# Patient Record
Sex: MALE | Race: WHITE | Employment: FULL TIME | URBAN - METROPOLITAN AREA
[De-identification: names, ages, dates, MRNs, and addresses within clinical notes are randomized per-mention and may not be internally consistent; named-entity substitution may affect disease eponyms.]

---

## 2020-01-22 ENCOUNTER — HOSPITAL ENCOUNTER (OUTPATIENT)
Dept: LAB | Age: 70
Discharge: HOME | End: 2020-01-22

## 2020-01-22 PROCEDURE — 88305 TISSUE EXAM BY PATHOLOGIST: CPT

## 2022-11-29 ENCOUNTER — OFFICE VISIT (OUTPATIENT)
Dept: ORTHOPEDIC SURGERY | Age: 72
End: 2022-11-29

## 2022-11-29 VITALS — WEIGHT: 190 LBS | BODY MASS INDEX: 29.82 KG/M2 | HEIGHT: 67 IN

## 2022-11-29 DIAGNOSIS — M25.562 LEFT KNEE PAIN, UNSPECIFIED CHRONICITY: Primary | ICD-10-CM

## 2022-11-29 DIAGNOSIS — M48.00 SPINAL STENOSIS, UNSPECIFIED SPINAL REGION: ICD-10-CM

## 2022-11-29 DIAGNOSIS — M17.12 PRIMARY OSTEOARTHRITIS OF LEFT KNEE: ICD-10-CM

## 2022-11-29 DIAGNOSIS — M79.605 LOW BACK PAIN RADIATING TO BOTH LEGS: ICD-10-CM

## 2022-11-29 DIAGNOSIS — M79.604 LOW BACK PAIN RADIATING TO BOTH LEGS: ICD-10-CM

## 2022-11-29 DIAGNOSIS — M54.50 LOW BACK PAIN RADIATING TO BOTH LEGS: ICD-10-CM

## 2022-11-29 NOTE — PROGRESS NOTES
Name: Tyrell Wolfe  YOB: 1950  Gender: male  MRN: [id number]    CC:   Chief Complaint   Patient presents with    Knee Pain     Left Knee pain          HPI: Tyrell Wolfe is a 67 y.o. male with a PMHx of low arthroscopy of both knees with Dr.Baumgarten. They present here for evaluation of left knee pain. This is been gradually coming on for some time. He has a history of former smoker and gout. His pain is most prominent when he is playing golf. Occasionally has it with walking and at night. Does not have any groin pain or instability of his knee. He does have a history of back pain. He does have some burning pain to both of his thighs with prolonged standing or walking. He has tried Aleve. He is previously undergone viscosupplementation a year ago which she says was not very helpful. No Known Allergies      Review of Systems:  As per HPI. Pertinent positives and negatives are addressed with the patient, particularly those related to musculoskeletal concerns. Non-orthopaedic concerns were referred back to the primary care physician. PHYSICAL EXAMINATION:   The patient appears their stated age and they are in no distress. Ht 5' 7\" (1.702 m)   Wt 190 lb (86.2 kg)   BMI 29.76 kg/m²       The lower extremities are as described below. No significant distal edema, venous stasis changes  There is no lymph adenopathy in the popliteal or malleolar region. Sensation is intact to light touch bilaterally. The gait is noted to be grossly normal  Knee Range of motion is 0 to 125 degrees bilaterally  AP varus and valgus stressing of the bilateral knees reveal global stability  ROM of hips is painless  Limb lengths are equal.  Hip flexion is excellent. Their judgment and insight are normal.  They are oriented to situation. mood and affect appropriate.       X-RAY: AP lateral skier and sunrise views of the left knee reveal severe narrowing of the medial compartment best appreciated on the skiers view. There is subchondral sclerosis. AP lateral and spot views of the lumbar spine reveal multilevel spondylosis and degenerative disc disease. Mild scoliotic changes. Grade 1 spondylolisthesis at L4-5. IMPRESSION: Osteoarthropathy of the left knee, lumbar spondylosis degenerative disc disease and spondylolisthesis    RECOMMENDATIONS:    Reviewed x-ray findings with the patient. The patient has moderate severe osteoarthritis of the left knee. I have discussed treatment options of continued nonoperative treatment versus left total knee arthroplasty. I have discussed the surgical procedure, surgical risk, and rehab time. He understands this and would like to proceed with surgery this next spring. He was provided with total knee literature, given a form for handicap parking sticker, referred to physical therapy for up rehab program.  The patient also is having burning and discomfort in the upper legs associated with standing walking. He has severe degenerative disc disease of the lumbar spine and I believe his symptoms are secondary to spinal stenosis.   I have recommended an MRI scan of the lumbar spine and referral to our spine team.      4--this is a chronic illness/condition with exacerbation

## 2022-11-29 NOTE — LETTER
[address] [place] [address] [address] [place] [address]        I recommended a handicap parking placard for the reason, walking 100 feet nonstop will aggravate their existing orthopedic condition, also the orthopedic condition creates a substantial limitation in routine walking. This permit is of medical necessity secondary to an impairment of mobility. Permanent tag.      MD [place] LIC # [id number]  Dr Jacque Galvin

## 2022-12-12 ENCOUNTER — OFFICE VISIT (OUTPATIENT)
Dept: ORTHOPEDIC SURGERY | Age: 72
End: 2022-12-12
Payer: MEDICARE

## 2022-12-12 DIAGNOSIS — M43.16 SPONDYLOLISTHESIS OF LUMBAR REGION: Primary | ICD-10-CM

## 2022-12-12 DIAGNOSIS — M47.816 FACET ARTHROPATHY, LUMBAR: ICD-10-CM

## 2022-12-12 DIAGNOSIS — M48.062 LUMBAR STENOSIS WITH NEUROGENIC CLAUDICATION: ICD-10-CM

## 2022-12-12 DIAGNOSIS — M51.36 DDD (DEGENERATIVE DISC DISEASE), LUMBAR: ICD-10-CM

## 2022-12-12 PROCEDURE — 99203 OFFICE O/P NEW LOW 30 MIN: CPT | Performed by: PHYSICIAN ASSISTANT

## 2022-12-12 PROCEDURE — 1123F ACP DISCUSS/DSCN MKR DOCD: CPT | Performed by: PHYSICIAN ASSISTANT

## 2022-12-12 RX ORDER — COLCHICINE 0.6 MG/1
0.6 TABLET ORAL DAILY
COMMUNITY
Start: 2021-06-23

## 2022-12-12 RX ORDER — ASPIRIN 81 MG/1
81 TABLET ORAL DAILY
COMMUNITY

## 2022-12-12 RX ORDER — CARVEDILOL 6.25 MG/1
TABLET ORAL
COMMUNITY
Start: 2022-10-17

## 2022-12-12 NOTE — LETTER
[facility]                                                     ANGELINA PAREDES  1950  MRN [id number]                                              ROOM NUMBER______      Radiographic Studies:    Cervical MRI      Thoracic MRI         Lumbar MRI          Pelvis MRI        CONTRAST    CT Myelogram: _______________   NCS/EMG ________________ ( UE  /  LE )     MRI of ___________________          Other: ____________________      Injections:    KNEE    HIP  Depomedrol _____ mg Euflexxa _____    _______________ TFESI/SNRB  _______________ SI Joint  _______________ MARICEL    _______________ Facet  _______________Piriformis/ Sciatica      Medications:    Oral Steroids _______________  NSAIDS _______________    Muscle Relaxers _______________  Neurontin/Lyrica _______________    Pain Medicine _______________  Other _______________                       Physical Therapy:    Lumbar     Thoracic      Cervical     Hip       Knee       Shoulder               Traction          Ultrasound          Dry Needling      Referral:    Pain referral:  [facility]   [facility]   Other: ______________________________    Follow-up/ Refer__________________________________________________    Authorization to hold blood thinners:___________________________________

## 2022-12-12 NOTE — PATIENT INSTRUCTIONS
Adult Spondylolisthesis in the Low Back    In spondylolisthesis, one of the bones in your spine -- called a vertebra -- slips forward and out of place. This may occur anywhere along the spine, but is most common in the lower back (lumbar spine). In some people, this causes no symptoms at all. Others may have back and leg pain that ranges from mild to severe. Understanding how your spine works can help you better understand spondylolisthesis. Types of Spondylolisthesis  Many types of spondylolisthesis can affect adults. The two most common types are degenerative and spondylolytic. There are other less common types of spondylolisthesis, such as slippage caused by a recent, severe fracture or a tumor. Degenerative Spondylolisthesis  As we age, general wear and tear causes changes in the spine. Intervertebral disks begin to dry out and weaken. They lose height, become stiff, and begin to bulge. This disk degeneration is the start to both arthritis and degenerative spondylolisthesis (DS). Degenerative spondylolisthesis  As arthritis develops, it weakens the joints and ligaments that hold your vertebrae in the proper position. The ligament along the back of your spine (ligamentum flavum) may begin to buckle. One of the vertebrae on either side of a worn, flattened disk can loosen and move forward over the vertebra below it. This slippage can narrow the spinal canal and put pressure on the spinal cord. This narrowing of the spinal canal is called spinal stenosis and is a common problem in patients with DS. Women are more likely than men to have DS, and it is more common in patients who are older than 50. A higher incidence has been noted in the -American population. In this x-ray taken from the side, vertebrae in the low back have slipped out of place due to degenerative spondylolisthesis.       Spondylolytic Spondylolisthesis  One of the bones in your lower back can break and this can cause a vertebra to slip forward. The break most often occurs in the area of your lumbar spine called the pars interarticularis. In most cases of spondylolytic spondylolisthesis, the pars fracture occurs during adolescence and goes unnoticed until adulthood. The normal disk degeneration that occurs in adulthood can then stress the pars fracture and cause the vertebra to slip forward. This type of spondylolisthesis is most often seen in middle-aged men. (Left) In spondylolysis, a fracture often occurs at the pars interarticularis. (Right) Because of the pars fracture, only the front part of the bone slips forward. Because a pars fracture causes the front (vertebra) and back (lamina) parts of the spinal bone to disconnect, only the front part slips forward. This means that narrowing of the spinal canal is less likely than in other kinds of spondylolisthesis, such as DS in which the entire spinal bone slips forward. This x-ray taken from the side shows a pars fracture (arrow) and the resulting spondylolisthesis. Symptoms  About 4% to 6% of the [place] population has spondylolysis and spondylolisthesis. Most of these people live with the condition for many years without any pain or other symptoms. Symptoms of Degenerative Spondylolisthesis  Patients with DS often visit the doctor's office once the slippage has begun to put pressure on the spinal nerves. Although the doctor may find arthritis in the spine, the symptoms of DS are typically the same as symptoms of spinal stenosis. For example, DS patients often develop leg and/or lower back pain. The most common symptoms in the legs include a feeling of vague weakness associated with prolonged standing or walking. Leg symptoms may be accompanied by numbness, tingling, and/or pain that is often affected by posture. Forward bending or sitting often relieves the symptoms because it opens up space in the spinal canal. Standing or walking often increases symptoms.     Symptoms of Spondylolytic Spondylolisthesis  Most patients with spondylolytic spondylolisthesis do not have pain and are often surprised to find they have the slippage when they see it in x-rays. They typically visit a doctor with low back pain related to activities. The back pain is sometimes accompanied by leg pain. To Top     Treatment    Nonsurgical Treatment  Although nonsurgical treatments will not repair the slippage, many patients report that these methods do help relieve symptoms. Physical therapy and exercise. Specific exercises can strengthen and stretch your lower back and abdominal muscles. Medication. Analgesics and non-steroidal anti-inflammatory medicines may relieve pain. Steroid injections. Cortisone is a powerful anti-inflammatory. Cortisone injections around the nerves or in the \"epidural space\" can decrease swelling, as well as pain. It is not recommended to receive these, however, more than three times per year. These injections are more likely to decrease pain and numbness, but not weakness of the legs. Surgical Treatment  Surgical candidates with DS. Surgery for degenerative spondylolisthesis is generally reserved for the patient who does not improve after a trial of nonsurgical treatment for at least 3 to 6 months. In making a decision about surgery, your doctor will also take into account the extent of arthritis in your spine, as well as whether your spine has excessive movement. DS patients who are candidates for surgery often are unable to walk or stand, and have a poor quality of life due to the pain and weakness. Surgical candidates with spondylolytic spondylolisthesis. Patients with symptoms that have not responded to nonsurgical treatment for at least 6 to12 months may be candidates for surgery.   If the slippage is getting worse or the patient has progressive neurologic symptoms, such as weakness, numbness, or falling, and/or symptoms of cauda equina syndrome, surgery may help.  Surgical procedures. Surgery for both DS and spondylolytic spondylolisthesis includes removing the pressure from the nerves and spinal fusion. Removing the pressure involves opening up the spinal canal. This procedure is called a laminectomy. Spinal fusion is essentially a \"welding\" process. The basic idea is to fuse together the painful vertebrae so that they heal into a single, solid bone. In spinal fusion, screws are often used to help stabilize the spine. Surgical recovery. The fusion process takes time. It may be several months before the bone is solid, although your comfort level will often improve much faster. Spondylitis (Spinal Arthritis) and Facet Joint Syndrome  At each level in our spine, there is a single disc  the bones (vertebrae) in front of the spinal canal, and a pair of joints called facet joints joining the bones together behind the spinal canal. As we age, the spinal discs and facet joints can wear out and degenerate. Disc degeneration is the term used to describe the wearing out of the discs. Spondylosis is the term used to describe degeneration and arthritis of the facet joints. Degeneration of the spine is a normal aging process, and in most cases spinal arthritis does not cause significant symptoms. However, for some people, arthritic facet joints can cause significant pain. Back or neck pain resulting from arthritic or inflamed facet joints is a condition called facet joint syndrome.         Symptoms  Back pain with radiation into hips and buttocks or neck pain with radiation into the shoulders  Natural History (Doing Nothing)  Not all arthritic facet joints cause symptoms   Back or neck pain may not be coming from the facet joints even if they are arthritic   Symptoms may resolve without treatment   Symptoms may be short-lived and infrequent   Rarely, patients develop more persistent and debilitating pain   Facet joints have very little ability to repair themselves or regenerate  Three Phases of Treatment:   Phase I - Non-Invasive Treatments   Phase II - Spinal Injections   Phase III - Surgery (rare for this condition unless radiculopathy is present)   Goals of Each Phase:   Relieve Pain   Improve Function  Treatment Options: Phase I - Non-Invasive Treatments  Physical Therapy and Regular Home Exercise   Neck or Back Strengthening   Flexibility and Stretching  Oral Medications   Steroids   Non-Steroid Anti-Inflammatories (NSAIDs)   Pain relievers   Muscle Relaxants  Ice and Heat   4-6 weeks of Phase I treatment before MRI and going to Phase II  Treatment Options: Phase II - Facet Joint Injections and Facet Joint Nerve Ablation (RFNA)  Facet Joint Injections   Outpatient procedure   Done with x-ray guidance   Steroid is injected into the inflamed joint to reduce pain   May relieve symptoms, but will not reverse the joint arthritis   Successful injection may help confirm that pain is coming from the facet joints   Facet Joint Rhizotomy (Nerve Ablation) (Radiofrequency Nerve Ablation - RFNA)  The word rhizotomy means nerve destruction or nerve ablation. In facet rhizotomy, the tiny nerve fibers that carry pain signals from the facet joints to the brain are selectively destroyed using some form of energy. For patients who have had successful, but temporary relief of their back or neck pain from the facet injections, facet rhizotomy may provide more long-term relief. Facet rhizotomy is most commonly performed using a form of energy called radiofrequency (RF) energy. When done with RF, this technique is often called radiofrequency nerve ablation (RFNA).    Treatment Options: Phase III - Surgery  Rarely needed for Spondylosis or Facet Joint Syndrome unless radiculopathy or stenosis is present   Back and neck pain from Spondylosis can be treated non-surgically in most cases    [facility]    MD Miguel A Godfrey, CORINA Loera NP    Main Office  [address] [facility],[address], [address] [facility], [facility] [address]        For Appointments at either location, please call [phone]      Lumbar Stenosis    What is lumbar stenosis? Stenosis is defined as the narrowing of the spinal canal. The term lumbar simply refers to the lowest 5 vertebrae in the spine. Externally this corresponds to the small of the back just above your buttocks. Each lumbar vertebra has 3 tunnels which can be affected by stenosis. The large tunnel in the middle of the vertebra is where the spinal cord and all of the spinal nerves are contained. Also, a much smaller tunnel is on each side of the vertebra. This is where the individual nerves exit the spine. Narrowing of any of these tunnels can result in pressure on the spinal cord or spinal nerves. Spinal stenosis may involve multiple levels of the spine or may be localized to a single level. What causes spinal stenosis? Typically the tunnels in vertebrae are quite spacious and much larger than the spinal cord and nerves that pass through them. However, some patients are genetically programmed to have smaller size tunnels in the spine, predisposing them to develop symptoms from spinal stenosis. There are several other potential causes of spinal stenosis. A single event, such as a disc herniation or a fracture can cause symptoms of stenosis. But more often, it is caused by arthritic changes, such as bone spurs, thickened ligaments, joint laxity, and disc bulges. This results in pinched spinal nerves which gives symptoms of buttock and leg pain and weakness. What are the symptoms of spinal stenosis? Patients will typically have low back pain along with pain in the buttocks and legs.   This usually affects patients more when they are standing or walking, and their pain is often relieved with sitting or lying. Many patients report that the decrease in their ability to walk is the most bothersome part of the condition. And, some have found that leaning forward (such as using a cart while shopping) has enabled them to go further with less pain. Are there other conditions that can cause similar symptoms? The condition which most closely mimics spinal stenosis is poor blood circulation to the legs (vascular claudication). Other conditions such as diabetic neuropathy and hip or knee arthritis also have very similar symptoms to spinal stenosis. What is the prognosis? The natural history of degenerative spinal stenosis is usually a slow progression, gradually reducing the ability to stand or walk for extended periods. What treatments are available? The use of anti-inflammatory medications may give partial relief of symptoms. Physical therapy is often prescribed initially, which may improve lumbar range of motion and strength. Chiropractic care may help ease early symptoms in some patients. A series of steroid injections into the spine may calm the inflammation of the nerves and give temporary relief of the buttock and leg symptoms. Though, these treatments may help the patient cope with the symptoms for several years, they have little effect on the natural history of the disease which is slow progression. When should I consider consulting a spine surgeon? When you are no longer able to tolerate the symptoms or it is interfering with your everyday activities despite the use of conservative treatments, you may be a good candidate for a decompression type of spine surgery. The procedure typically performed is called a laminectomy. Some patients may require a fusion in addition to the laminectomy if there is too much joint laxity from the arthritic changes in the spine.    A decompression operation for spinal stenosis is about 80% effective in reducing your buttock and leg symptoms, including your ability to stand and walk. Though there may be some reduction in low back pain, a laminectomy is much less predictable for the treatment of isolated back pain without symptoms in the buttocks or legs.       [facility]    MD Deon Herrera MD Amy Nolia Comer, CORINA Cage NP    Main Office  [address] [facility],[address], [facility] [place] [address] [address]        For Appointments at either location, please call [phone]

## 2022-12-12 NOTE — PROGRESS NOTES
Name: Guerita Whyte  YOB: 1950  Gender: male  MRN: [id number]    CC: Back Pain (Low back pain with front thigh pain)       HPI: This is a 67y.o. year old male who  has a past medical history of Arthritis, Cancer ([address] GERD (gastroesophageal reflux disease), Gout, and Hypertension. .  Is referred to me by Dr. Wendy Gorman. He is scheduled for left total knee arthroplasty May 2023. Further evaluation he did discuss lower back pain. Patient has reported some chronic lower back pain can somewhat radiate into the buttock and hips but especially in the lower back. Occasionally at night and when he wakes up it would keep him from going back to sleep with achiness in the back. He also describes some burning in his thighs after his been standing or walking for period time but he is usually able to play golf okay its more standing going to a museum where he has a burning in the thighs. There was concern he may have some referred pain from his back or stenosis. MRI scan and x-rays were done. He is here today for further follow-up. History was obtained by patient    This patient  has not had lumbar surgery in the past.      AMB PAIN ASSESSMENT 12/12/2022   Location of Pain Back   Location Modifiers Left;Right   Severity of Pain 5   Quality of Pain Aching; Other (Comment)   Duration of Pain Persistent   Frequency of Pain Constant   Date Pain First Started 1/9/2019   Aggravating Factors Other (Comment); Standing   Limiting Behavior No   Relieving Factors Other (Comment); Rest   Result of Injury No   Work-Related Injury No   Are there other pain locations you wish to document? No            ROS/Meds/PSH/PMH/FH/SH: I personally reviewed the patient's collected intake data.   Below are the pertinents:    No Known Allergies      Current Outpatient Medications:     aspirin 81 MG EC tablet, Take 81 mg by mouth daily, Disp: , Rfl:     carvedilol (COREG) 6.25 MG tablet, , Disp: , Rfl:     colchicine (COLCRYS) 0.6 MG tablet, Take 0.6 mg by mouth daily, Disp: , Rfl:     esomeprazole (NEXIUM) 20 MG delayed release capsule, Take 20 mg by mouth daily, Disp: , Rfl:     allopurinol (ZYLOPRIM) 100 MG tablet, Take 100 mg by mouth as needed, Disp: , Rfl:     amLODIPine (NORVASC) 10 MG tablet, Take 10 mg by mouth, Disp: , Rfl:     benazepril (LOTENSIN) 40 MG tablet, Take 40 mg by mouth daily, Disp: , Rfl:     Naproxen Sodium 220 MG CAPS, Take 1 tablet by mouth as needed, Disp: , Rfl:     omeprazole (PRILOSEC) 20 MG delayed release capsule, Take 20 mg by mouth (Patient not taking: Reported on 12/12/2022), Disp: , Rfl:     rosuvastatin (CRESTOR) 5 MG tablet, Take 5 mg by mouth, Disp: , Rfl:     Past Surgical History:   Procedure Laterality Date    KNEE ARTHROSCOPY Bilateral     TONSILLECTOMY  age 4       There is no problem list on file for this patient. Tobacco:  reports that he quit smoking about 14 years ago. His smoking use included cigarettes. He smoked an average of 2 packs per day. He has never used smokeless tobacco.  Alcohol:   Social History     Substance and Sexual Activity   Alcohol Use Yes    Alcohol/week: 10.0 standard drinks        Physical Exam:   @VS1@   GENERAL:  Adult in no acute distress, well developed, well nourished Patient is appropriately conversant  The patient ambulates with a normal gait. ROM of bilateral hip(s) reveals no irritability. NEURO:  Cranial nerves grossly intact. No motor deficits. Sensory testing reveals intact sensation to light touch and in the distribution of the L3-S1 dermatomes bilaterally      Strength testing in the lower extremity reveals the following based on the 5 point grading scale:     HF (L2) H Ab (L5) KE (L3/4) ADF (L4) EHL (L5) A Ev (S1) APF (S1)   Right 5 5 5 5 5 5 5   Left 5 5 5 5 5 5 5     PSYCH:  Alert and oriented X 3. Appropriate affect. Intact judgment and insight.          Radiographic Studies:     AP, lateral and spot views of the lumbar spine: 12/12/22      I reviewed previous x-rays of the lumbar spine from November 29, 2022. There isSlight levoscoliosis of the lumbar spine. Hips show no significant DJD. Sagittal view of the lumbar spine reveals spondylolisthesis L4-5 and bulky facet arthropathy degenerative disc changes multiple levels. MRI Result (most recent): MRI LUMBAR SPINE WO CONTRAST 12/07/2022    Narrative  MR OF THE LUMBAR SPINE WITHOUT CONTRAST. Clinical Indication: Lower back pain with left leg pain for years    Procedure: Multiplanar and multisequence MR images are performed of the lumbar  spine without gadolinium contrast.    Comparison: No prior    Findings: The lumbar vertebral bodies are normal in height and signal.  Multilevel degenerative disc signal is present. No aggressive bone lesion noted. There is no fracture. The marrow signal in the imaged sacrum is normal. The  conus medullaris is normal in morphology and signal terminating in expected  position at T12-L1    Axial images:    L1-L2: No central stenosis or foramina narrowing. L2-L3: No central stenosis or foramina narrowing. L3-L4: A circumferential disc bulge with mild degenerative facet arthropathy. There is no central stenosis. Moderate bilateral foramina narrowing is present. L4-L5: A circumferential disc bulge with left greater than right degenerative  facet arthropathy. There is no central stenosis. Moderate bilateral foramina  narrowing is present. L5-S1: A slight circumferential disc bulge with mild degenerative facet  arthropathy. Moderate to severe right and moderate left foramina narrowing are  present. Limited evaluation of the paraspinal soft tissues shows incomplete evaluation of  renal cysts, larger on the left. The hip joints are normal.    Impression  1. Multilevel degenerative disc and facet disease throughout the lumbar spine  without acute musculoskeletal abnormality or central spinal stenosis.   2. Bilateral foramina narrowing of varying severity at multiple levels as noted  in detail above. 3. Bilateral renal cysts, incompletely evaluated. I also independently reviewed the MRI of the lumbar spine. He has multilevel facet arthropathy. There is an anterior listhesis L4-5. L3-4 there is moderate bilateral foraminal narrowing today some lateral recess stenosis. L4-5 also some lateral recess stenosis and moderate bilateral foraminal narrowing. L5-S1 no central stenosis but there is some foraminal narrowing as well. Assessment/Plan:         Diagnosis Orders   1. Spondylolisthesis of lumbar region  Ambulatory referral to Physical Therapy      2. DDD (degenerative disc disease), lumbar  Ambulatory referral to Physical Therapy      3. Facet arthropathy, lumbar  Ambulatory referral to Physical Therapy      4. Lumbar stenosis with neurogenic claudication  Ambulatory referral to Physical Therapy            This patient's clinical history and physical exam is consistent with neurogenic claudication which is likely due to lumbar stenosis. Stenosis is mostly foraminal and lateral recess he does have spondylolisthesis at L4-5. I discussed the natural history of lumbar stenosis in that it is a degenerative condition that is usually slowly progressive resulting in gradual loss of mobility. I reassured the patient that this is not a condition that typically predisposes him   to an acute paraplegia; however, the more neurologic deficits acquired and the longer they go untreated, the less likely he is to have neurological improvements after an operation. He understands that conservative treatments typically include physical therapy, oral and/or epidural steroids, pain management, and simple observation. And, that these treatments do not address the anatomical pinching of the spinal nerves, but rather help patients cope with the resulting symptoms.   I also discussed potential surgical intervention if the symptoms fail to improve or there is a progressive neurologic deficit or conservative management has been exhausted. - A home exercise program was prescribed for stretching and strengthening. A list of exercises was provided. - Physical Therapy was prescribed and will include stretching, strengthening and modalities to promote blood flow, flexibility and core strengthening. I will see him back after his physical therapy this will probably be in March. If we need to do lumbar injections at that time we can discuss the possibility of lumbar facet but also lumbar L4-5 epidural.      No orders of the defined types were placed in this encounter. Orders Placed This Encounter   Procedures    Ambulatory referral to Physical Therapy        4 This is a chronic illness/condition with exacerbation and progression      Return in about 3 months (around 3/12/2023). Krissy Langley PA-C  12/12/22      Elements of this note were created using speech recognition software. As such, errors of speech recognition may be present.

## 2023-02-03 NOTE — PROGRESS NOTES
[facility]  [address]  [address] [address]  Dept: [phone]      Physical Therapy Initial Assessment     Insurance: No authorization required [facility])    Total Timed Procedure Codes: 30 min, Total Time: 55 min      Referring MD: Christopher Keys PA-C  Referral for: Chronic low back pain  Onset date: 2/6/2020      Diagnosis:     ICD-10-CM    1. Chronic left-sided low back pain with left-sided sciatica  M54.42     G89.29       2. Joint stiffness of spine  M25.60       3. Impaired mobility and ADLs  Z74.09     Z78.9       4. Difficulty walking  R26.2       5. Muscle weakness  M62.81       6. Chronic pain of left knee  M25.562     G89.29            Therapy precautions:None  Co-morbidities affecting plan of care: HTN, Prostate CA(2003), Left knee OA with TKA scheduled in May. PERTINENT MEDICAL HISTORY     Past medical and surgical history:   Past Medical History:   Diagnosis Date    Arthritis     OA    Cancer ([facility] [address] 2003    prostate - seed implants    GERD (gastroesophageal reflux disease)     controlled with meds    Gout     Hypertension     controlled with meds      Past Surgical History:   Procedure Laterality Date    KNEE ARTHROSCOPY Bilateral     TONSILLECTOMY  age 10     Medications: reviewed in chart   Allergies: No Known Allergies     Diagnostic exams (per chart review): MRI LUMBAR SPINE WO CONTRAST 12/07/2022  Impression  1. Multilevel degenerative disc and facet disease throughout the lumbar spine  without acute musculoskeletal abnormality or central spinal stenosis. 2. Bilateral foramina narrowing of varying severity at multiple levels as noted  in detail above. 3. Bilateral renal cysts, incompletely evaluated. SUBJECTIVE     Chief complaints/history of injury: Patient presents to therapy with chronic low back pain. He states he has left TKA scheduled in May. He c/o chronic low back pain that has progressively worsened over the last 2 years. He c/o left knee pain.  He c/o limited motion low back. He does not recall any injury to left knee or low back. He has increased pain low back and anterior thighs with prolonged standing. He c/o left side low back pain into left buttock. Received previous outpatient therapy? No    Pain Assessment:  Pain location: left low back and into left buttock/left knee    Average Pain/symptom intensity (0-10 scale)  Last 24 hours: 3/10 (low back)  Last week (1-7 days): 4/10 (at the worst)  How often do you feel symptoms? Frequently (51-75%)  Description: aching, burning, and dull  Aggravating factors: prolonged standing and walking  Alleviating factors: sitting, lying down, medication: Aleve, and rest    Neuro screen: denies numbness, tingling, and radiating pain He c/o burning pain in anterior thighs with prolonged standing    Social/Functional Hx: How would you rate your overall health? very good  Pt lives with independent spouse in a(n) condo with entry steps. Current DME: none  Work Status: Retired   Sleep: moderately disturbed  PLOF & Social Hx/Interests: Independent and active without physical limitations and participated in travel and golfing  How much have your symptoms interfered with daily activities? Moderately  Current level of function:  able to do ADLs with pain and modification. He is able to golf with minimal difficulty.  He has difficulty with prolonged standing and walking due to papin    Patient Stated Goals: reduce painful symptoms    OBJECTIVE EXAMINATION     Functional Outcome Questionnaire: Oswestry Low Back Pain Disability Questionnaire: 7/50= 14% functional deficit   Observation:   Posture:  no gross deviations                 Palpation: left piriformis     AROM Lumbar Spine: normal is 4/4    Lumbar Spine Right Left Comments   Trunk Flexioin 3     Trunk Extension 3     HS Flexibility (-44) (-38)       A/PROM Measures:      Right Left Comment   Knee Flexion 140A 132A    Knee Extension 0 4 L lacks full extension Strength/MMT (0-5 Scale):     Right Left Comment   Knee Flexion 5 5    Knee Extension 5 5          Hip Flexion 5 5    Hip Extension 5 5    Hip Abduction 5 5    Hip ER      Hip IR      Ankle DF 5 5    Ankle PF                  Special Tests/Function:   Gait: no gross deviations  Stair management:reciprocal ascending/descending    Special tests:   Standing forward bend: negative  Slump: negative  SLR: negative    Treatment provided today consisted of initial evaluation followed by: Therapeutic exercise (89000) x 30 min to address ROM/strength deficits and to develop an initial HEP as noted below. Included:   SAQ 20x  LAQ 20x  Quad sets 20x  SLR 10x2  SKTC 2x  LTR 2x  Piriformis stretch 2x  Seated HS stretch in chair 1 minute      Patient Education on the condition/pathology, involved anatomy, and exercise rationale. CLINICAL DECISION MAKING/ASSESSMENT     Personal Factors/co-morbidities affecting POC (1-2 Medium/3+High): coping styles/strategies  habits/routines  past/current experiences   Problem List: (1-2 Low/ 3 Medium/ 4+ High) Pain  ROM limitations  Strength deficits  Decreased endurance/activity Tolerance  ADL/functional limitations/modifications  Restricted social activity  Restricted recreational participation  Decreased Posey with Home Exercise Program  Difficulty sleeping    Clinical decision making: low complexity with therapist able to easily and confidently determine and predict expectations and future outcomes for the POC. Prognosis: good   Benefits and precautions of treatment explained to patient. Seng Murguia is a 67 y.o. male who presents to therapy today with evolving/changing clinical presentation (moderate complexity)  related to low back pain and left knee pain. Pt would benefit from skilled physical therapy services to address the deficits noted above for return to prior level of function. PLAN OF CARE     Effective Dates: 2/6/2023 TO 3/20/2023  (42 days). Frequency/Duration: 1x/week for 42 Day(s)  Interventions may include but are not limited to: (21670) Therapeutic exercise to develop ROM, strength, endurance and flexibility  (51442) Therapeutic activities using dynamic activities to improve function  (24158) Manual therapy techniques to improve joint and/or soft tissue mobility, ROM, and function as well as helping to decrease pain/spasms and swelling  Home exercise program (HEP) development  Modalities prn to address pain, spasms, and swelling: (42725/) Electrical stimulation- unattended  (77706) Ultrasound/phonophoresis  (20209) Vasopneumatic compression  (19187) Hot/cold pack    The referring physician has reviewed and approved this evaluation and plan of care as noted by the electronic signature attached to note.     GOALS     Short term goals to be met by 3/6/2023  (4 weeks):  Patient will be independent with HEP  Patient will increase left knee extension to 0 (full extension)  Patient will be able to stand for 30 minutes without pain and having to sit   Patient will be able to resume sleeping after getting out of bed to go to bathroom  Patient will have no pain/difficulty with bed mobility  Patient will be able to move sit to stand from 20\" seat without difficulty  Patient will report 2/10 pain low back with at worst      Long term goals to be met by 3/20/2023  (6 weeks):  Patient will demonstrate full pain-free ROM back/trunk   Patient will have full left knee extension without pain  Bbilateral hamstrings flexibility will increase by  10 degrees each LE  Patient will be able to stand for 60 minutes without back pain  Patient will be able to walk for 60 minutes on all surfaces including uneven surface without back pain   Patient will be able to ambulate normally for community distances without assistive device  Patient will be able to sleep all night without waking due to back pain  Patient will return to previous level of function with ADLs  Improve Oswestry to >/= 5% functional deficit  Patient is discharged from PT     [address] Code: [email or web address]  URL: [email or web address]  Date: 02/06/2023  Prepared by:  Raven Collado    Exercises  Supine Lower Trunk Rotation - 2 x daily - 7 x weekly - 1 sets - 3 reps - 30 seconds hold  Hooklying Single Knee to Chest Stretch - 2 x daily - 7 x weekly - 1 sets - 3 reps - 30 seconds hold  Seated Hamstring Stretch - 3 x daily - 7 x weekly - 1 sets - 1 reps - 1 minute hold  Supine Piriformis Stretch with Foot on Ground - 2 x daily - 7 x weekly - 1 sets - 3 reps - 30 seconds hold  Supine Active Straight Leg Raise - 1 x daily - 7 x weekly - 2 sets - 10 reps - don't hold  Supine Quad Set - 1 x daily - 7 x weekly - 1 sets - 20 reps - 5 seconds hold  Seated Long Arc Quad - 1 x daily - 7 x weekly - 1 sets - 20 reps - don't hold  Supine Knee Extension Strengthening - 1 x daily - 7 x weekly - 1 sets - 20 reps - don't hold

## 2023-02-06 ENCOUNTER — OFFICE VISIT (OUTPATIENT)
Age: 73
End: 2023-02-06
Payer: MEDICARE

## 2023-02-06 DIAGNOSIS — R26.2 DIFFICULTY WALKING: ICD-10-CM

## 2023-02-06 DIAGNOSIS — G89.29 CHRONIC LEFT-SIDED LOW BACK PAIN WITH LEFT-SIDED SCIATICA: Primary | ICD-10-CM

## 2023-02-06 DIAGNOSIS — M25.562 CHRONIC PAIN OF LEFT KNEE: ICD-10-CM

## 2023-02-06 DIAGNOSIS — M62.81 MUSCLE WEAKNESS: ICD-10-CM

## 2023-02-06 DIAGNOSIS — Z78.9 IMPAIRED MOBILITY AND ADLS: ICD-10-CM

## 2023-02-06 DIAGNOSIS — Z74.09 IMPAIRED MOBILITY AND ADLS: ICD-10-CM

## 2023-02-06 DIAGNOSIS — M54.42 CHRONIC LEFT-SIDED LOW BACK PAIN WITH LEFT-SIDED SCIATICA: Primary | ICD-10-CM

## 2023-02-06 DIAGNOSIS — M25.60 JOINT STIFFNESS OF SPINE: ICD-10-CM

## 2023-02-06 DIAGNOSIS — G89.29 CHRONIC PAIN OF LEFT KNEE: ICD-10-CM

## 2023-02-06 PROCEDURE — 97162 PT EVAL MOD COMPLEX 30 MIN: CPT | Performed by: PHYSICAL THERAPIST

## 2023-02-06 PROCEDURE — 97110 THERAPEUTIC EXERCISES: CPT | Performed by: PHYSICAL THERAPIST

## 2023-02-10 NOTE — PROGRESS NOTES
[address] [facility]  [address] [facility]  [address] [address]  Dept: [phone]      Physical Therapy Daily Note     Insurance: No authorization required [facility])    Total Timed Procedure Codes: 45 min, Total Time: 50 min      Referring MD: Coral St PA-C  Referral for: Chronic low back pain  Onset date: 2/6/2020      Diagnosis:     ICD-10-CM    1. Chronic left-sided low back pain with left-sided sciatica  M54.42     G89.29       2. Joint stiffness of spine  M25.60       3. Impaired mobility and ADLs  Z74.09     Z78.9       4. Difficulty walking  R26.2       5. Muscle weakness  M62.81       6. Chronic pain of left knee  M25.562     G89.29            Therapy precautions:None  Co-morbidities affecting plan of care: HTN, Prostate CA(2003), Left knee OA with TKA scheduled in May. Diagnostic exams (per chart review): MRI LUMBAR SPINE WO CONTRAST 12/07/2022  Impression  1. Multilevel degenerative disc and facet disease throughout the lumbar spine  without acute musculoskeletal abnormality or central spinal stenosis. 2. Bilateral foramina narrowing of varying severity at multiple levels as noted  in detail above. 3. Bilateral renal cysts, incompletely evaluated. SUBJECTIVE     Patient reports no change in low back pain. He states he was able to play golf without increased pain last week. No problem with HEP. OBJECTIVE     Treatment provided today:  Therapeutic exercise (35311) x 45 min to develop ROM, strength, endurance and flexibility. Included:   Walk in clinic for 5 minutes  SAQ 20x with 2#  LAQ 20x with 2#  Quad sets 20x  SLR 10x2    SKTC 2x  LTR 2x  Piriformis stretch 2x  Seated HS stretch in chair 1 minute  Calf stretch 1 minute    Sit to stand from 20\" seat 20x  Standing hip 3 way 20x  Clams 20x  Bridges 20x      Patient Education on the condition/pathology, involved anatomy, and exercise rationale. ASSESSMENT     No increase in right or left knee pain with exercises.  He has no increase in low back pain with exercises. He has full right and left knee extension. He I able to move sit to stand from 20\" seat without difficulty. PLAN     Continue with stretching and strengthening exercises to improve function and gait. Progress exercises as tolerated. Use modalities and manual therapy to reduce painful symptoms as needed. PLAN OF CARE     Effective Dates: 2/6/2023 TO 3/20/2023  (42 days).     Frequency/Duration: 1x/week for 42 Day(s)      GOALS     Short term goals to be met by 3/6/2023  (4 weeks):  Patient will be independent with HEP  Patient will increase left knee extension to 0 (full extension)-MET  Patient will be able to stand for 30 minutes without pain and having to sit   Patient will be able to resume sleeping after getting out of bed to go to bathroom  Patient will have no pain/difficulty with bed mobility  Patient will be able to move sit to stand from 20\" seat without difficulty-MET  Patient will report 2/10 pain low back with at worst      Long term goals to be met by 3/20/2023  (6 weeks):  Patient will demonstrate full pain-free ROM back/trunk   Patient will have full left knee extension without pain  Bbilateral hamstrings flexibility will increase by  10 degrees each LE  Patient will be able to stand for 60 minutes without back pain  Patient will be able to walk for 60 minutes on all surfaces including uneven surface without back pain   Patient will be able to ambulate normally for community distances without assistive device  Patient will be able to sleep all night without waking due to back pain  Patient will return to previous level of function with ADLs  Improve Oswestry to >/= 5% functional deficit  Patient is discharged from [facility]    Access Code: [email or web address]

## 2023-02-13 ENCOUNTER — OFFICE VISIT (OUTPATIENT)
Age: 73
End: 2023-02-13
Payer: MEDICARE

## 2023-02-13 DIAGNOSIS — M62.81 MUSCLE WEAKNESS: ICD-10-CM

## 2023-02-13 DIAGNOSIS — M25.562 CHRONIC PAIN OF LEFT KNEE: ICD-10-CM

## 2023-02-13 DIAGNOSIS — Z78.9 IMPAIRED MOBILITY AND ADLS: ICD-10-CM

## 2023-02-13 DIAGNOSIS — G89.29 CHRONIC PAIN OF LEFT KNEE: ICD-10-CM

## 2023-02-13 DIAGNOSIS — G89.29 CHRONIC LEFT-SIDED LOW BACK PAIN WITH LEFT-SIDED SCIATICA: Primary | ICD-10-CM

## 2023-02-13 DIAGNOSIS — M25.60 JOINT STIFFNESS OF SPINE: ICD-10-CM

## 2023-02-13 DIAGNOSIS — Z74.09 IMPAIRED MOBILITY AND ADLS: ICD-10-CM

## 2023-02-13 DIAGNOSIS — M54.42 CHRONIC LEFT-SIDED LOW BACK PAIN WITH LEFT-SIDED SCIATICA: Primary | ICD-10-CM

## 2023-02-13 DIAGNOSIS — R26.2 DIFFICULTY WALKING: ICD-10-CM

## 2023-02-13 PROCEDURE — 97110 THERAPEUTIC EXERCISES: CPT | Performed by: PHYSICAL THERAPIST

## 2023-02-14 ENCOUNTER — OFFICE VISIT (OUTPATIENT)
Dept: ORTHOPEDIC SURGERY | Age: 73
End: 2023-02-14

## 2023-02-14 DIAGNOSIS — M19.012 GLENOHUMERAL ARTHRITIS, LEFT: Primary | ICD-10-CM

## 2023-02-14 DIAGNOSIS — M67.912 TENDINOPATHY OF LEFT ROTATOR CUFF: ICD-10-CM

## 2023-02-14 DIAGNOSIS — M25.512 CHRONIC LEFT SHOULDER PAIN: ICD-10-CM

## 2023-02-14 DIAGNOSIS — G89.29 CHRONIC LEFT SHOULDER PAIN: ICD-10-CM

## 2023-02-14 RX ORDER — METHYLPREDNISOLONE ACETATE 40 MG/ML
40 INJECTION, SUSPENSION INTRA-ARTICULAR; INTRALESIONAL; INTRAMUSCULAR; SOFT TISSUE ONCE
Status: COMPLETED | OUTPATIENT
Start: 2023-02-14 | End: 2023-02-14

## 2023-02-14 RX ADMIN — METHYLPREDNISOLONE ACETATE 40 MG: 40 INJECTION, SUSPENSION INTRA-ARTICULAR; INTRALESIONAL; INTRAMUSCULAR; SOFT TISSUE at 10:13

## 2023-02-14 NOTE — PROGRESS NOTES
Name: Rajiv Magdaleno  YOB: 1950  Gender: male  MRN: [id number]  Date of Encounter:  2/14/2023       CHIEF COMPLAINT:     Chief Complaint   Patient presents with    Shoulder Pain     Left        SUBJECTIVE/OBJECTIVE:      HPI:    Patient is a 67 y.o. pleasant male who presents today for a new evaluation of his left shoulder pain. He has had several weeks of left shoulder pain. Today is a good day and pain is not as severe. On questioning he reports that the pain is deep, but also in the lateral shoulder. He often experiences pain with raising the arm. Pain is typically intermittent. He has issues with both his back and his knee and is planning on a total knee replacement with Dr. Russella Gowers in May, but the shoulder hurts him more than anything else. He takes Aleve daily with minimal relief. He is right-hand dominant. PAST HISTORY:   Past medical, surgical, family, social history and allergies reviewed by me. Pertinent history: Gout  Tobacco use:  reports that he quit smoking about 14 years ago. His smoking use included cigarettes. He smoked an average of 2 packs per day. He has never used smokeless tobacco.  Diabetes: none  Anticoagulation: no      REVIEW OF SYSTEMS:   As noted in HPI. PHYSICAL EXAMINATION:     Gen: Well-developed, no acute distress   HEENT: NC/AT, EOMI   Neck: Trachea midline, normal ROM   CV: Regular rhythm by palpation of distal pulse, normal capillary refill   Pulm: No respiratory distress, no stridor   Psychiatric: Well oriented, normal mood and affect. Skin: No rashes, lesions or ulcers, normal temperature, turgor, and texture on uninvolved extremity. ORTHO EXAM:    Left Shoulder:     No atrophy or significant deformity, no erythema or warmth, no effusion  Forward flexion 170, abduction 160, external rotation 35, internal rotation lumbar spine  Anterior glenohumeral tenderness most prominent.   Minimal tenderness rotator cuff footprint or bicipital groove. 5/5 resisted abduction, internal and external rotation  Mildly painful empty can, negative Davidson, negative biceps load and Potter's  2+ radial pulse, sensation tact light touch    DIAGNOSTIC IMAGING:     X-ray 3 vw LEFT shoulder Grashey  / axillary / scapular Y    Findings: Moderate glenohumeral joint arthritis with inferior glenoid osteophyte. Moderate AC joint arthritis. There is mild cortical irregularity to the greater tuberosity. There is no effusion. Lung fields appear clear. 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 Glenohumeral arthritis, left    2. Chronic left shoulder pain    3. Tendinopathy of left rotator cuff         I discussed his exam and imaging findings with him today. He has glenohumeral and AC joint arthritis. He has a slightly limited description of his pain today as his pain is minimal, but seems more related to the shoulder joint, but the rotator cuff may be involved as well. I advised a steroid injection to the left glenohumeral joint as this may give him some relief if he has an articular sided rotator cuff tear. We can consider bursa injection in the future if this does not give him considerable pain relief. He was provided a handout of rotator cuff rehabilitation exercises to perform. Continue over-the-counter medication as needed in moderation. He will follow-up in 4 weeks for recheck.     Orders / medications today:   Orders Placed This Encounter    XR SHOULDER LEFT (MIN 2 VIEWS)     Left shoulder Grashey Outlet & Axillary     Standing Status:   Future     Number of Occurrences:   1     Standing Expiration Date:   2/13/2024    US ARTHR/ASP/INJ MAJOR JNT/BURSA LEFT     Standing Status:   Future     Number of Occurrences:   1     Standing Expiration Date:   2/14/2024    methylPREDNISolone acetate (DEPO-MEDROL) injection 40 mg      Follow up: Return in about 4 weeks (around 3/14/2023). The patient expressed understanding and agreed with the plan. Jaskaran Nicholas MD   [facility]     This document was created using voice recognition software so frequent mistakes are possible. For any concerns about the wording of this document, please contact its creator for further clarification. Left Glenohumeral Joint Injection - Ultrasound Guided     An injection of corticosteroid was discussed today and is indicated for patients pain and condition today. All risks and benefits were discussed and patient wishes to proceed. Prior to proceeding forward with a steroid injection to the left glenohumeral joint, consent was obtained. Patient was explained the risks of injection, including infection, bleeding, nerve damage, and pain at the site of injection were discussed at length with the patient. Benefits including pain relief were also discussed with the patient. Patient verbalizes understanding of these and consents to procedure. Time-out was conducted with all members of the care team.     The patient was positioned lying on their side. A posterior lateral approach was utilized for this injection procedure. The site of the injection was cleansed chlorhexidine. site of the injection was then cleansed chlorhexidine. A sterile gel was then placed over the area and the ultrasound probe was used to positioned to reveal the glenohumeral joint. Following this a vapo coolant spray was utilized to provide local skin anesthesia. A solution of 40mg Depo-medrol and 5cc 1% lidocaine was delivered through a 22 gauge, 3.5\" spinal into the intraarticular space. The site was again cleansed with an alcohol swab. Ultrasound was used to ensure adequate deposition of the injectate solution into the correct location. The patient tolerated this procedure well with no adverse events.  There was some notable pain relief reported by the patient prior to leaving the office today. The patient was counseled not to submerge the site for 24 hours, not to perform strenuous activity for the next five days, and if notes any signs or symptoms consistent with joint infection or allergic reaction to go to a local emergency room. The patient was observed for 15 minutes postprocedure and was allowed to be discharged from clinic in their usual state of health. Imaging was saved to PACS system.

## 2023-02-17 NOTE — PROGRESS NOTES
[address]  [address]  [address] [address]  Dept: [phone]      Physical Therapy Daily Note     Insurance: No authorization required [facility])    Total Timed Procedure Codes: 45 min, Total Time: 45 min      Referring MD: Nima Hunt PA-C  Referral for: Chronic low back pain  Onset date: 2/6/2020      Diagnosis:     ICD-10-CM    1. Chronic left-sided low back pain with left-sided sciatica  M54.42     G89.29       2. Joint stiffness of spine  M25.60       3. Impaired mobility and ADLs  Z74.09     Z78.9       4. Difficulty walking  R26.2       5. Muscle weakness  M62.81       6. Chronic pain of left knee  M25.562     G89.29            Therapy precautions:None  Co-morbidities affecting plan of care: HTN, Prostate CA(2003), Left knee OA with TKA scheduled in May. Diagnostic exams (per chart review): MRI LUMBAR SPINE WO CONTRAST 12/07/2022  Impression  1. Multilevel degenerative disc and facet disease throughout the lumbar spine  without acute musculoskeletal abnormality or central spinal stenosis. 2. Bilateral foramina narrowing of varying severity at multiple levels as noted  in detail above. 3. Bilateral renal cysts, incompletely evaluated. SUBJECTIVE     Patient reports he played golf this weekend. He states he played well. He state he has mild low back pain but it was ok golfing. He states he saw  Dr. Toby Mak regarding left shoulder pain. He stats she gave him an injection and shoulder seems better. She diagnosed left shoulder OA. No problem with HEP. OBJECTIVE     Treatment provided today:  Therapeutic exercise (53322) x 45 min to develop ROM, strength, endurance and flexibility.   Included:   Walk in clinic for 5 minutes    LAQ 20x with 2#  Quad sets 20x  SLR 20x    SKTC 2x  LTR 2x  Piriformis stretch 2x  Seated HS stretch in chair 1 minute  Calf stretch 1 minute    Sit to stand from 20\" seat 20x  Standing hip 3 way 20x with teal loop   Side step 2x with teal loop  Clams 20x  Bridges 20x  Calf steretch 1 minute      Patient Education on the condition/pathology, involved anatomy, and exercise rationale. ASSESSMENT     No increase in right or left knee pain with exercises. He has no increase in low back pain with exercises. He has full right and left knee extension. He is able to play golf with minimal increase in knee pain and no increase in back pain. PLAN     Continue with stretching and strengthening exercises to improve function and gait. Progress exercises as tolerated. Use modalities and manual therapy to reduce painful symptoms as needed. PLAN OF CARE     Effective Dates: 2/6/2023 TO 3/20/2023  (42 days).     Frequency/Duration: 1x/week for 42 Day(s)      GOALS     Short term goals to be met by 3/6/2023  (4 weeks):  Patient will be independent with HEP  Patient will increase left knee extension to 0 (full extension)-MET  Patient will be able to stand for 30 minutes without pain and having to sit   Patient will be able to resume sleeping after getting out of bed to go to bathroom  Patient will have no pain/difficulty with bed mobility  Patient will be able to move sit to stand from 20\" seat without difficulty-MET  Patient will report 2/10 pain low back with at worst      Long term goals to be met by 3/20/2023  (6 weeks):  Patient will demonstrate full pain-free ROM back/trunk   Patient will have full left knee extension without pain  Bbilateral hamstrings flexibility will increase by  10 degrees each LE  Patient will be able to stand for 60 minutes without back pain  Patient will be able to walk for 60 minutes on all surfaces including uneven surface without back pain   Patient will be able to ambulate normally for community distances without assistive device  Patient will be able to sleep all night without waking due to back pain  Patient will return to previous level of function with ADLs  Improve Oswestry to >/= 5% functional deficit  Patient is discharged from PT     [address] [place]: [id number]

## 2023-02-20 ENCOUNTER — OFFICE VISIT (OUTPATIENT)
Age: 73
End: 2023-02-20
Payer: MEDICARE

## 2023-02-20 DIAGNOSIS — Z74.09 IMPAIRED MOBILITY AND ADLS: ICD-10-CM

## 2023-02-20 DIAGNOSIS — R26.2 DIFFICULTY WALKING: ICD-10-CM

## 2023-02-20 DIAGNOSIS — G89.29 CHRONIC PAIN OF LEFT KNEE: ICD-10-CM

## 2023-02-20 DIAGNOSIS — M25.562 CHRONIC PAIN OF LEFT KNEE: ICD-10-CM

## 2023-02-20 DIAGNOSIS — M54.42 CHRONIC LEFT-SIDED LOW BACK PAIN WITH LEFT-SIDED SCIATICA: Primary | ICD-10-CM

## 2023-02-20 DIAGNOSIS — Z78.9 IMPAIRED MOBILITY AND ADLS: ICD-10-CM

## 2023-02-20 DIAGNOSIS — M62.81 MUSCLE WEAKNESS: ICD-10-CM

## 2023-02-20 DIAGNOSIS — M25.60 JOINT STIFFNESS OF SPINE: ICD-10-CM

## 2023-02-20 DIAGNOSIS — G89.29 CHRONIC LEFT-SIDED LOW BACK PAIN WITH LEFT-SIDED SCIATICA: Primary | ICD-10-CM

## 2023-02-20 PROCEDURE — 97110 THERAPEUTIC EXERCISES: CPT | Performed by: PHYSICAL THERAPIST

## 2023-02-24 NOTE — PROGRESS NOTES
[address] [facility] [address]   Dept: [phone]      Physical Therapy Discharge     Insurance: No authorization required [facility])    Total Timed Procedure Codes: 40 min, Total Time: 40 min      Referring MD: Corinna Haley PA-C  Referral for: Chronic low back pain  Onset date: 2/6/2020      Diagnosis:     ICD-10-CM    1. Chronic left-sided low back pain with left-sided sciatica  M54.42     G89.29       2. Joint stiffness of spine  M25.60       3. Impaired mobility and ADLs  Z74.09     Z78.9       4. Difficulty walking  R26.2       5. Muscle weakness  M62.81       6. Chronic pain of left knee  M25.562     G89.29            Therapy precautions:None  Co-morbidities affecting plan of care: HTN, Prostate CA(2003), Left knee OA with TKA scheduled in May. Diagnostic exams (per chart review): MRI LUMBAR SPINE WO CONTRAST 12/07/2022  Impression  1. Multilevel degenerative disc and facet disease throughout the lumbar spine  without acute musculoskeletal abnormality or central spinal stenosis. 2. Bilateral foramina narrowing of varying severity at multiple levels as noted  in detail above. 3. Bilateral renal cysts, incompletely evaluated. SUBJECTIVE     Patient reports low back feels pretty good. He is playing golf without increased pain low back. He states shoulder is feeling better since injection last week. He states he is traveling out of the country for several weeks. He will continue with HEP independently. He wants to be discharged from PT.   OBJECTIVE     Treatment provided today:  Therapeutic exercise (47259) x 40 min to develop ROM, strength, endurance and flexibility.   Included:   Walk in clinic for 6 minutes    LAQ 20x with 2#  Quad sets 20x      SKTC 2x  LTR 2x  Piriformis stretch 2x  Seated HS stretch in chair 1 minute      Sit to stand from 20\" seat 20x  Standing hip 3 way 20x with teal loop         Patient Education on the condition/pathology, involved anatomy, and exercise rationale. AROM Lumbar Spine: normal is 4/4     Lumbar Spine Right Left Comments   Trunk Flexioin 3       Trunk Extension 3       HS Flexibility (-20) (-28)        A/PROM Measures:        Right Left Comment   Knee Flexion 140A 132A     Knee Extension 0 4 L lacks full extension                          Strength/MMT (0-5 Scale):       Right Left Comment   Knee Flexion 5 5     Knee Extension 5 5               Hip Flexion 5 5     Hip Extension 5 5     Hip Abduction 5 5     Hip ER         Hip IR         Ankle DF 5 5     Ankle PF                         Pain Assessment:  Pain location: left low back and into left buttock/left knee    Average Pain/symptom intensity (0-10 scale)  Last 24 hours: 1/10 (low back)  Last week (1-7 days): 1/10 (at the worst)  How often do you feel symptoms? Occasionally  (25%)    Functional Outcome Questionnaire: Oswestry Low Back Pain Disability Questionnaire: 7/50= 14% functional deficit     ASSESSMENT     Patient is independent with HEP. He is playing golf without increased pain right or left knee. He has improved flexibility lumbar spine and right/left knees. PLAN     Patient is discharged with HEP. PLAN OF CARE     Effective Dates: 2/6/2023 TO 3/20/2023  (42 days).     Frequency/Duration: 1x/week for 42 Day(s)      GOALS     Short term goals to be met by 3/6/2023  (4 weeks):  Patient will be independent with HEP-MET  Patient will increase left knee extension to 0 (full extension)-MET  Patient will be able to stand for 30 minutes without pain and having to sit -MET  Patient will be able to resume sleeping after getting out of bed to go to bathroom-MET  Patient will have no pain/difficulty with bed mobility-MET  Patient will be able to move sit to stand from 20\" seat without difficulty-MET  Patient will report 2/10 pain low back with at worst-MET most days      Long term goals to be met by 3/20/2023  (6 weeks):  Patient will demonstrate full pain-free ROM back/trunk   Patient will have full left knee extension without pain  Bbilateral hamstrings flexibility will increase by  10 degrees each LE  Patient will be able to stand for 60 minutes without back pain  Patient will be able to walk for 60 minutes on all surfaces including uneven surface without back pain   Patient will be able to ambulate normally for community distances without assistive device  Patient will be able to sleep all night without waking due to back pain  Patient will return to previous level of function with ADLs  Improve Oswestry to >/= 5% functional deficit  Patient is discharged from [facility]    Access Code: [email or web address]

## 2023-02-27 ENCOUNTER — OFFICE VISIT (OUTPATIENT)
Age: 73
End: 2023-02-27
Payer: MEDICARE

## 2023-02-27 DIAGNOSIS — M62.81 MUSCLE WEAKNESS: ICD-10-CM

## 2023-02-27 DIAGNOSIS — M25.562 CHRONIC PAIN OF LEFT KNEE: ICD-10-CM

## 2023-02-27 DIAGNOSIS — G89.29 CHRONIC PAIN OF LEFT KNEE: ICD-10-CM

## 2023-02-27 DIAGNOSIS — G89.29 CHRONIC LEFT-SIDED LOW BACK PAIN WITH LEFT-SIDED SCIATICA: Primary | ICD-10-CM

## 2023-02-27 DIAGNOSIS — Z74.09 IMPAIRED MOBILITY AND ADLS: ICD-10-CM

## 2023-02-27 DIAGNOSIS — M25.60 JOINT STIFFNESS OF SPINE: ICD-10-CM

## 2023-02-27 DIAGNOSIS — R26.2 DIFFICULTY WALKING: ICD-10-CM

## 2023-02-27 DIAGNOSIS — M54.42 CHRONIC LEFT-SIDED LOW BACK PAIN WITH LEFT-SIDED SCIATICA: Primary | ICD-10-CM

## 2023-02-27 DIAGNOSIS — Z78.9 IMPAIRED MOBILITY AND ADLS: ICD-10-CM

## 2023-02-27 PROCEDURE — 97110 THERAPEUTIC EXERCISES: CPT | Performed by: PHYSICAL THERAPIST

## 2023-03-21 ENCOUNTER — OFFICE VISIT (OUTPATIENT)
Dept: ORTHOPEDIC SURGERY | Age: 73
End: 2023-03-21
Payer: MEDICARE

## 2023-03-21 DIAGNOSIS — M67.912 TENDINOPATHY OF LEFT ROTATOR CUFF: ICD-10-CM

## 2023-03-21 DIAGNOSIS — M19.012 GLENOHUMERAL ARTHRITIS, LEFT: Primary | ICD-10-CM

## 2023-03-21 PROCEDURE — 99213 OFFICE O/P EST LOW 20 MIN: CPT | Performed by: STUDENT IN AN ORGANIZED HEALTH CARE EDUCATION/TRAINING PROGRAM

## 2023-03-21 PROCEDURE — 1123F ACP DISCUSS/DSCN MKR DOCD: CPT | Performed by: STUDENT IN AN ORGANIZED HEALTH CARE EDUCATION/TRAINING PROGRAM

## 2023-03-21 NOTE — PROGRESS NOTES
Name: Patrick Archer  YOB: 1950  Gender: male  MRN: [id number]  Date of Encounter:  3/21/2023       CHIEF COMPLAINT:     Chief Complaint   Patient presents with    Follow-up     Left Shoulder         SUBJECTIVE/OBJECTIVE:      HPI:    Patient is a 67 y.o. pleasant male who presents today for a return evaluation of his right shoulder. Working diagnosis:   1. Glenohumeral arthritis, left    2. Tendinopathy of left rotator cuff       LOV: 2/14/2023 2/14/23: CSI to left GHJ performed. Given HEP. He had great relief with GHJ injection. He feels like it was a \"miracle drug. \" He has no pain with motion or sleep. He is continuing home exercises. PAST HISTORY:   Past medical, surgical, family, social history and allergies reviewed by me. Unchanged from prior visit. REVIEW OF SYSTEMS:   As noted in HPI. PHYSICAL EXAMINATION:     Gen: Well-developed, no acute distress   HEENT: NC/AT, EOMI   Neck: Trachea midline, normal ROM   CV: Regular rhythm by palpation of distal pulse, normal capillary refill   Pulm: No respiratory distress, no stridor   Psychiatric: Well oriented, normal mood and affect. Skin: No rashes, lesions or ulcers, normal temperature, turgor, and texture on uninvolved extremity. ORTHO EXAM:    L shoulder:   170 FF, 160 abduction, external rotation 35, internal rotation lumbar spine. No tenderness on exam. 5/5 resisted abduction, internal and external rotation. Negative Davidson, empty can  2+ radial pulse SILT    DIAGNOSTIC IMAGING:     I have reviewed prior imaging studies. ASSESSMENT/PLAN:   1. Glenohumeral arthritis, left    2. Tendinopathy of left rotator cuff         Responded well to CSI. He will continue home exercises for the shoulder. Discussed timing of consideration of repeat injection, which would be just before his knee TKA. I do not think this would interfere. If we continue to have ongoing issues, may recommend MRI to evaluate the cuff.  Dont

## 2023-03-28 ENCOUNTER — OFFICE VISIT (OUTPATIENT)
Dept: ORTHOPEDIC SURGERY | Age: 73
End: 2023-03-28

## 2023-03-28 VITALS — WEIGHT: 185 LBS | BODY MASS INDEX: 29.03 KG/M2 | HEIGHT: 67 IN

## 2023-03-28 DIAGNOSIS — M17.12 PRIMARY OSTEOARTHRITIS OF LEFT KNEE: Primary | ICD-10-CM

## 2023-03-28 NOTE — PROGRESS NOTES
Name: Isidro Medel  YOB: 1950  Gender: male  MRN: [id number]    CC: Follow-up (Oa left knee)       HPI: Isidro Medel is a 67 y.o. male who presents with Follow-up (Sona Geller left knee)  The patient is seen today for follow-up of osteoarthritis of the left knee. He is decided to proceed with left total knee replacement and is scheduled for the end of May. He is here for discussion of left total knee replacement. Symptoms continue to worsen as far as discomfort and pain left knee associated with standing and walking. He has pain with change of position and stair climbing. History was obtained by patient    ROS/Meds/PSH/PMH/FH/SH: I personally reviewed the patients standard intake form. Current Outpatient Medications:     aspirin 81 MG EC tablet, Take 81 mg by mouth daily, Disp: , Rfl:     carvedilol (COREG) 6.25 MG tablet, , Disp: , Rfl:     colchicine (COLCRYS) 0.6 MG tablet, Take 0.6 mg by mouth daily, Disp: , Rfl:     esomeprazole (NEXIUM) 20 MG delayed release capsule, Take 20 mg by mouth daily, Disp: , Rfl:     allopurinol (ZYLOPRIM) 100 MG tablet, Take 100 mg by mouth as needed, Disp: , Rfl:     amLODIPine (NORVASC) 10 MG tablet, Take 10 mg by mouth, Disp: , Rfl:     benazepril (LOTENSIN) 40 MG tablet, Take 40 mg by mouth daily, Disp: , Rfl:     Naproxen Sodium 220 MG CAPS, Take 1 tablet by mouth as needed, Disp: , Rfl:     omeprazole (PRILOSEC) 20 MG delayed release capsule, Take 20 mg by mouth (Patient not taking: Reported on 12/12/2022), Disp: , Rfl:     rosuvastatin (CRESTOR) 5 MG tablet, Take 5 mg by mouth, Disp: , Rfl:    Past Medical History:   Diagnosis Date    Arthritis     OA    Cancer ([facility] [address] 2003    prostate - seed implants    GERD (gastroesophageal reflux disease)     controlled with meds    Gout     Hypertension     controlled with meds        Physical Examination:  Alert and oriented x3. Ambulatory with a slight left antalgic gait. Yoseph Kluver is intact. Moderate effusion.

## 2023-05-05 ENCOUNTER — PREP FOR PROCEDURE (OUTPATIENT)
Dept: ORTHOPEDIC SURGERY | Age: 73
End: 2023-05-05

## 2023-05-05 DIAGNOSIS — M17.12 PRIMARY OSTEOARTHRITIS OF LEFT KNEE: Primary | ICD-10-CM

## 2023-05-05 RX ORDER — ACETAMINOPHEN 325 MG/1
1000 TABLET ORAL ONCE
Status: CANCELLED | OUTPATIENT
Start: 2023-05-05 | End: 2023-05-05

## 2023-05-05 RX ORDER — SODIUM CHLORIDE 0.9 % (FLUSH) 0.9 %
5-40 SYRINGE (ML) INJECTION PRN
Status: CANCELLED | OUTPATIENT
Start: 2023-05-05

## 2023-05-05 RX ORDER — SODIUM CHLORIDE 0.9 % (FLUSH) 0.9 %
5-40 SYRINGE (ML) INJECTION EVERY 12 HOURS SCHEDULED
Status: CANCELLED | OUTPATIENT
Start: 2023-05-05

## 2023-05-05 RX ORDER — SODIUM CHLORIDE 9 MG/ML
INJECTION, SOLUTION INTRAVENOUS PRN
Status: CANCELLED | OUTPATIENT
Start: 2023-05-05

## 2023-05-09 ENCOUNTER — HOSPITAL ENCOUNTER (OUTPATIENT)
Dept: SURGERY | Age: 73
Discharge: HOME | End: 2023-05-12
Payer: MEDICARE

## 2023-05-09 ENCOUNTER — HOSPITAL ENCOUNTER (OUTPATIENT)
Dept: REHABILITATION | Age: 73
Discharge: HOME | End: 2023-05-12
Payer: MEDICARE

## 2023-05-09 VITALS
SYSTOLIC BLOOD PRESSURE: 153 MMHG | BODY MASS INDEX: 30.04 KG/M2 | DIASTOLIC BLOOD PRESSURE: 96 MMHG | HEART RATE: 89 BPM | RESPIRATION RATE: 18 BRPM | WEIGHT: 191.8 LBS | OXYGEN SATURATION: 95 % | TEMPERATURE: 98.1 F

## 2023-05-09 DIAGNOSIS — M17.12 PRIMARY OSTEOARTHRITIS OF LEFT KNEE: ICD-10-CM

## 2023-05-09 LAB
ANION GAP SERPL CALC-SCNC: 8 MMOL/L (ref 2–11)
APTT PPP: 32.4 SEC (ref 24.5–34.2)
BACTERIA SPEC CULT: NORMAL
BASOPHILS # BLD: 0 K/UL (ref 0–0.2)
BASOPHILS NFR BLD: 1 % (ref 0–2)
BUN SERPL-MCNC: 13 MG/DL (ref 8–23)
CALCIUM SERPL-MCNC: 8.9 MG/DL (ref 8.3–10.4)
CHLORIDE SERPL-SCNC: 105 MMOL/L (ref 101–110)
CO2 SERPL-SCNC: 27 MMOL/L (ref 21–32)
CREAT SERPL-MCNC: 0.76 MG/DL (ref 0.8–1.5)
DIFFERENTIAL METHOD BLD: ABNORMAL
EKG ATRIAL RATE: 85 BPM
EKG DIAGNOSIS: NORMAL
EKG P AXIS: 28 DEGREES
EKG P-R INTERVAL: 172 MS
EKG Q-T INTERVAL: 398 MS
EKG QRS DURATION: 86 MS
EKG QTC CALCULATION (BAZETT): 473 MS
EKG R AXIS: 59 DEGREES
EKG T AXIS: 15 DEGREES
EKG VENTRICULAR RATE: 85 BPM
EOSINOPHIL # BLD: 0.1 K/UL (ref 0–0.8)
EOSINOPHIL NFR BLD: 1 % (ref 0.5–7.8)
ERYTHROCYTE [DISTWIDTH] IN BLOOD BY AUTOMATED COUNT: 15 % (ref 11.9–14.6)
EST. AVERAGE GLUCOSE BLD GHB EST-MCNC: 120 MG/DL
GLUCOSE SERPL-MCNC: 92 MG/DL (ref 65–100)
HBA1C MFR BLD: 5.8 % (ref 4.8–5.6)
HCT VFR BLD AUTO: 42.7 % (ref 41.1–50.3)
HGB BLD-MCNC: 14.3 G/DL (ref 13.6–17.2)
IMM GRANULOCYTES # BLD AUTO: 0.1 K/UL (ref 0–0.5)
IMM GRANULOCYTES NFR BLD AUTO: 1 % (ref 0–5)
INR PPP: 1
LYMPHOCYTES # BLD: 1.1 K/UL (ref 0.5–4.6)
LYMPHOCYTES NFR BLD: 21 % (ref 13–44)
MCH RBC QN AUTO: 30.6 PG (ref 26.1–32.9)
MCHC RBC AUTO-ENTMCNC: 33.5 G/DL (ref 31.4–35)
MCV RBC AUTO: 91.4 FL (ref 82–102)
MONOCYTES # BLD: 0.5 K/UL (ref 0.1–1.3)
MONOCYTES NFR BLD: 11 % (ref 4–12)
NEUTS SEG # BLD: 3.4 K/UL (ref 1.7–8.2)
NEUTS SEG NFR BLD: 66 % (ref 43–78)
NRBC # BLD: 0 K/UL (ref 0–0.2)
PLATELET # BLD AUTO: 243 K/UL (ref 150–450)
PMV BLD AUTO: 10.5 FL (ref 9.4–12.3)
POTASSIUM SERPL-SCNC: 3.2 MMOL/L (ref 3.5–5.1)
PROTHROMBIN TIME: 12.8 SEC (ref 12.6–14.3)
RBC # BLD AUTO: 4.67 M/UL (ref 4.23–5.6)
SERVICE CMNT-IMP: NORMAL
SODIUM SERPL-SCNC: 140 MMOL/L (ref 133–143)
WBC # BLD AUTO: 5.1 K/UL (ref 4.3–11.1)

## 2023-05-09 PROCEDURE — 87641 MR-STAPH DNA AMP PROBE: CPT

## 2023-05-09 PROCEDURE — 83036 HEMOGLOBIN GLYCOSYLATED A1C: CPT

## 2023-05-09 PROCEDURE — 85730 THROMBOPLASTIN TIME PARTIAL: CPT

## 2023-05-09 PROCEDURE — 85610 PROTHROMBIN TIME: CPT

## 2023-05-09 PROCEDURE — 85025 COMPLETE CBC W/AUTO DIFF WBC: CPT

## 2023-05-09 PROCEDURE — 97161 PT EVAL LOW COMPLEX 20 MIN: CPT

## 2023-05-09 PROCEDURE — 80048 BASIC METABOLIC PNL TOTAL CA: CPT

## 2023-05-09 ASSESSMENT — PAIN SCALES - GENERAL
PAINLEVEL_OUTOF10: 0
PAINLEVEL_OUTOF10: 8

## 2023-05-09 ASSESSMENT — KOOS JR
GOING UP OR DOWN STAIRS: 1
HOW SEVERE IS YOUR KNEE STIFFNESS AFTER FIRST WAKING IN MORNING: 1
KOOS JR TOTAL INTERVAL SCORE: 63.776
STRAIGHTENING KNEE FULLY: 1
RISING FROM SITTING: 1
BENDING TO THE FLOOR TO PICK UP OBJECT: 2
STANDING UPRIGHT: 2
TWISING OR PIVOTING ON KNEE: 1

## 2023-05-09 ASSESSMENT — PROMIS GLOBAL HEALTH SCALE
SUM OF RESPONSES TO QUESTIONS 3, 6, 7, & 8: 18
IN THE PAST 7 DAYS, HOW OFTEN HAVE YOU BEEN BOTHERED BY EMOTIONAL PROBLEMS, SUCH AS FEELING ANXIOUS, DEPRESSED, OR IRRITABLE [ON A SCALE FROM 1 (NEVER) TO 5 (ALWAYS)]?: 5
SUM OF RESPONSES TO QUESTIONS 2, 4, 5, & 10: 17
IN GENERAL, PLEASE RATE HOW WELL YOU CARRY OUT YOUR USUAL SOCIAL ACTIVITIES (INCLUDES ACTIVITIES AT HOME, AT WORK, AND IN YOUR COMMUNITY, AND RESPONSIBILITIES AS A PARENT, CHILD, SPOUSE, EMPLOYEE, FRIEND, ETC) [ON A SCALE OF 1 (POOR) TO 5 (EXCELLENT)]?: 4
IN GENERAL, HOW WOULD YOU RATE YOUR SATISFACTION WITH YOUR SOCIAL ACTIVITIES AND RELATIONSHIPS [ON A SCALE OF 1 (POOR) TO 5 (EXCELLENT)]?: 4
WHO IS THE PERSON COMPLETING THE PROMIS V1.1 SURVEY?: 0
TO WHAT EXTENT ARE YOU ABLE TO CARRY OUT YOUR EVERYDAY PHYSICAL ACTIVITIES SUCH AS WALKING, CLIMBING STAIRS, CARRYING GROCERIES, OR MOVING A CHAIR [ON A SCALE OF 1 (NOT AT ALL) TO 5 (COMPLETELY)]?: 4
IN THE PAST 7 DAYS, HOW WOULD YOU RATE YOUR PAIN ON AVERAGE [ON A SCALE FROM 0 (NO PAIN) TO 10 (WORST IMAGINABLE PAIN)]?: 7
IN GENERAL, WOULD YOU SAY YOUR QUALITY OF LIFE IS...[ON A SCALE OF 1 (POOR) TO 5 (EXCELLENT)]: 4
IN GENERAL, HOW WOULD YOU RATE YOUR MENTAL HEALTH, INCLUDING YOUR MOOD AND YOUR ABILITY TO THINK [ON A SCALE OF 1 (POOR) TO 5 (EXCELLENT)]?: 4
IN GENERAL, WOULD YOU SAY YOUR HEALTH IS...[ON A SCALE OF 1 (POOR) TO 5 (EXCELLENT)]: 4
IN GENERAL, HOW WOULD YOU RATE YOUR PHYSICAL HEALTH [ON A SCALE OF 1 (POOR) TO 5 (EXCELLENT)]?: 3
IN THE PAST 7 DAYS, HOW WOULD YOU RATE YOUR FATIGUE ON AVERAGE [ON A SCALE FROM 1 (NONE) TO 5 (VERY SEVERE)]?: 4
HOW IS THE PROMIS V1.1 BEING ADMINISTERED?: 0

## 2023-05-09 ASSESSMENT — PAIN DESCRIPTION - LOCATION: LOCATION: KNEE

## 2023-05-09 ASSESSMENT — PAIN DESCRIPTION - DESCRIPTORS: DESCRIPTORS: ACHING;STABBING

## 2023-05-09 ASSESSMENT — PAIN DESCRIPTION - ORIENTATION: ORIENTATION: LEFT;ANTERIOR;INNER

## 2023-05-09 NOTE — PERIOP NOTE
PLEASE CONTINUE TAKING ALL PRESCRIPTION MEDICATIONS UP TO THE DAY OF SURGERY UNLESS OTHERWISE DIRECTED BELOW. DISCONTINUE all vitamins, herbals and supplements 21 days prior to surgery. DISCONTINUE Non-Steriodal Anti-Inflammatory (NSAIDS) such as Advil, Ibuprofen, Motrin, Naproxen and Aleve 5 days prior to surgery. Home Medications to HOLD                                     (In addition to the above)                            Home medications to TAKE the morning of surgery    Nexium, Allopurinol, Amlodipine, Carvedilol            Comments   The day before surgery please take Tylenol (Acetaminophen) 1000mg in the morning and 1000mg before bed. You may substitute for Tylenol 650 mg.         Bring Dynahex wash and Incentive Spirometer with you to hospital on the day of surgery. Please do not bring home medications with you on the day of surgery unless otherwise directed by your nurse. If you are instructed to bring home medications, please give them to your nurse as they will be administered by the nursing staff. If you have any questions, please call Layne Caldwell [phone]. A copy of this note was provided to the patient for reference.

## 2023-05-09 NOTE — PROGRESS NOTES
Kayla Gill  : 1950  Primary: Toma [facility]  Secondary:  [facility]  [address], [address]  [phone]        Physical Therapy Prehab Evaluation Summary:2023   Time In/Out   PT Charge Capture  Episode     MEDICAL/REFERRING DIAGNOSIS: Unilateral primary osteoarthritis, left knee [M17.12]  REFERRING PHYSICIAN: Bijan Schwab., *    ICD-10: Treatment Diagnosis:   Pain in Left Knee (M25.562)  Stiffness of Left Knee, Not elsewhere classified (A83.269)    DATE OF SURGERY: 23  Assessment:   COMMENTS:  He is here alone and is having a L TKA. He is planning on going home on the day of surgery. His wife will offer assist at DC. He has a shower chair, but will need a RW prior to DC. PROBLEM LIST:   (Impacting functional limitations):  Mr. Gaye Moreno presents with the following lower extremity(s) problems:  Strength  Range of Motion  Home Exercise Program  Pain INTERVENTIONS PLANNED:   (Benefits and precautions of physical therapy have been discussed with the patient.)  Home Exercise Program  Educational Discussion       GOALS: (Goals have been discussed and agreed upon with patient.)  Discharge Goals: Time Frame: 1 Day  Patient will demonstrate independence with a home exercise program designed to increase strength, range of motion, and pain control to minimize functional deficits and optimize patient for total joint replacement. Subjective:   Past Medical History/Comorbidities:   Mr. Gaye Moreno  has a past medical history of Arthritis, Cancer ([address] GERD (gastroesophageal reflux disease), Gout, and Hypertension. Mr. Gaye Moreno  has a past surgical history that includes Knee arthroscopy (Bilateral) and Tonsillectomy (age 10). Allergies:  Patient has no known allergies.     Current Medications:  Refer to pre-assessment nursing note    Home Set-Up/Prior Level of Function:  Lives With: Spouse  Type of Home: [address] [place] [address]

## 2023-05-09 NOTE — PERIOP NOTE
The below lab results are within anesthesia guidelines, no follow-up required. Labs automatically routed to ordering provider via Epic documentation.        Latest Reference Range & Units 05/09/23 10:42   Sodium 133 - 143 mmol/L 140   Potassium 3.5 - 5.1 mmol/L 3.2 (L)   Chloride 101 - 110 mmol/L 105   CO2 21 - 32 mmol/L 27   BUN,BUNPL 8 - 23 MG/DL 13   Creatinine 0.8 - 1.5 MG/DL 0.76 (L)   Anion Gap 2 - 11 mmol/L 8   Est, Glom Filt Rate >60 ml/min/1.73m2 >60   Glucose, Random 65 - 100 mg/dL 92   CALCIUM, SERUM, 051588 8.3 - 10.4 MG/DL 8.9   WBC 4.3 - 11.1 K/uL 5.1   RBC 4.23 - 5.6 M/uL 4.67   Hemoglobin Quant 13.6 - 17.2 g/dL 14.3   Hematocrit 41.1 - 50.3 % 42.7   MCV 82.0 - 102.0 FL 91.4   MCH 26.1 - 32.9 PG 30.6   MCHC 31.4 - 35.0 g/dL 33.5   MPV 9.4 - 12.3 FL 10.5   RDW 11.9 - 14.6 % 15.0 (H)   Platelet Count 616 - 450 K/uL 243   Absolute Mono # 0.1 - 1.3 K/UL 0.5   Eosinophils % 0.5 - 7.8 % 1   Basophils Absolute 0.0 - 0.2 K/UL 0.0   Differential Type -   AUTOMATED   Seg Neutrophils 43 - 78 % 66   Segs Absolute 1.7 - 8.2 K/UL 3.4   Lymphocytes 13 - 44 % 21   Absolute Lymph # 0.5 - 4.6 K/UL 1.1   Monocytes 4.0 - 12.0 % 11   Absolute Eos # 0.0 - 0.8 K/UL 0.1   Basophils 0.0 - 2.0 % 1   Immature Granulocytes 0.0 - 5.0 % 1   Nucleated Red Blood Cells 0.0 - 0.2 K/uL 0.00   Absolute Immature Granulocyte 0.0 - 0.5 K/UL 0.1   Prothrombin Time 12.6 - 14.3 sec 12.8   INR -   1.0   PTT 24.5 - 34.2 SEC 32.4   MSSA/MRSA SCREEN BY PCR  Rpt   (L): Data is abnormally low  (H): Data is abnormally high  Rpt: View report in Results Review for more information

## 2023-05-09 NOTE — PERIOP NOTE
The following records have been requested from [facility]:       [address]    Please send most recent EKG via fax to [phone]. Thank you!

## 2023-05-09 NOTE — PERIOP NOTE
Patient verified name and . Order for consent found in EHR and matches case posting; patient verified. Type 3 surgery, joint assessment complete. Labs per surgeon: CBC,BMP, PT/PTT, HgbA1c; results pending. Labs per anesthesia protocol: no additional  EKG: Completed today; results to be reviewed by anesthesia. Charge nurse to f/u. MRSA/MSSA swab collected; pharmacy to review and dose antibiotic as appropriate. Hospital approved surgical skin cleanser and instructions to return bottle on DOS given per hospital policy. Patient provided with handouts including Guide to Surgery, Pain Management, Hand Hygiene, Blood Transfusion Education, and [facility] Brochure. Patient answered medical/surgical history questions at their best of ability. All prior to admission medications documented in [facility]. Original medication prescription bottle visualized during patient appointment. Patient instructed to hold all vitamins, supplements, herbals 3 weeks prior to surgery and NSAIDS/ASA 5 days prior to surgery. Patient teach back successful and patient demonstrates knowledge of instruction.

## 2023-05-09 NOTE — PROGRESS NOTES
Total Joint Surgery Preoperative Chart Review      Patient ID:  Leno Elkins  [id number]  1 y.o.  1950  Surgeon: Dr. Yulyia Thornton  Date of Surgery: 2023  Procedure: Total Left Knee Arthroplasty  Primary Care Physician: Shalini Harper [phone]  Specialty Physician(s):      Subjective:   Leno Elkins is a 67 y.o. White (non-) male who presents for preoperative evaluation for Total Left Knee arthroplasty. This is a preoperative chart review note based on data collected by the nurse at the surgical Pre-Assessment visit. Past Medical History:   Diagnosis Date    Arthritis     OA    Cancer ([facility] [address]     prostate - seed implants    GERD (gastroesophageal reflux disease)     controlled with meds    Gout     Hypertension     controlled with meds      Past Surgical History:   Procedure Laterality Date    KNEE ARTHROSCOPY Bilateral     TONSILLECTOMY  age 10     Family History   Problem Relation Age of Onset    Hypertension Mother     Hypertension Father     Cancer Father     Stroke Mother     Diabetes Mother       Social History     Tobacco Use    Smoking status: Former     Packs/day: 2.00     Types: Cigarettes     Quit date: 2008     Years since quittin.9    Smokeless tobacco: Never   Substance Use Topics    Alcohol use: Yes     Alcohol/week: 10.0 standard drinks       Prior to Admission medications    Medication Sig Start Date End Date Taking?  Authorizing Provider   carvedilol (COREG) 6.25 MG tablet 2 times daily 10/17/22   Historical Provider, MD   colchicine (COLCRYS) 0.6 MG tablet Take 0.6 mg by mouth daily 21   Historical Provider, MD   esomeprazole (NEXIUM) 20 MG delayed release capsule Take 1 capsule by mouth daily    Historical Provider, MD   allopurinol (ZYLOPRIM) 100 MG tablet Take 1 tablet by mouth 2 times daily    Ar Automatic Reconciliation   amLODIPine (NORVASC) 10 MG tablet Take 1 tablet by mouth    Ar Automatic Reconciliation   benazepril (LOTENSIN) 40 MG tablet

## 2023-05-12 DIAGNOSIS — M17.12 PRIMARY OSTEOARTHRITIS OF LEFT KNEE: Primary | ICD-10-CM

## 2023-05-16 ENCOUNTER — OFFICE VISIT (OUTPATIENT)
Dept: ORTHOPEDIC SURGERY | Age: 73
End: 2023-05-16

## 2023-05-16 DIAGNOSIS — M67.912 TENDINOPATHY OF LEFT ROTATOR CUFF: ICD-10-CM

## 2023-05-16 DIAGNOSIS — M19.012 GLENOHUMERAL ARTHRITIS, LEFT: Primary | ICD-10-CM

## 2023-05-16 RX ORDER — METHYLPREDNISOLONE ACETATE 40 MG/ML
40 INJECTION, SUSPENSION INTRA-ARTICULAR; INTRALESIONAL; INTRAMUSCULAR; SOFT TISSUE ONCE
Status: COMPLETED | OUTPATIENT
Start: 2023-05-16 | End: 2023-05-16

## 2023-05-16 RX ADMIN — METHYLPREDNISOLONE ACETATE 40 MG: 40 INJECTION, SUSPENSION INTRA-ARTICULAR; INTRALESIONAL; INTRAMUSCULAR; SOFT TISSUE at 08:55

## 2023-05-16 NOTE — PROGRESS NOTES
Name: Guerita Whyte  YOB: 1950  Gender: male  MRN: [id number]  Date of Encounter:  5/16/2023       CHIEF COMPLAINT:     Chief Complaint   Patient presents with    Follow-up     Left shoulder GH CSI        SUBJECTIVE/OBJECTIVE:      HPI:    Patient is a 67 y.o. pleasant male who presents today for a return evaluation of his left shoulder. Working diagnosis:   1. Glenohumeral arthritis, left    2. Tendinopathy of left rotator cuff       LOV: 3/21/2023     He had great relief of shoulder pain with first CSI to the left GHJ. It gave him nearly 3 months of pain relief. He started to have pain about a week shy of 3 months. He has an upcoming TKA with Dr. Wendy Gorman and would like a repeat injection. PAST HISTORY:   Past medical, surgical, family, social history and allergies reviewed by me. Unchanged from prior visit. REVIEW OF SYSTEMS:   As noted in HPI. PHYSICAL EXAMINATION:     Gen: Well-developed, no acute distress   HEENT: NC/AT, EOMI   Neck: Trachea midline, normal ROM   CV: Regular rhythm by palpation of distal pulse, normal capillary refill   Pulm: No respiratory distress, no stridor   Psychiatric: Well oriented, normal mood and affect. Skin: No rashes, lesions or ulcers, normal temperature, turgor, and texture on uninvolved extremity. ORTHO EXAM:    L shoulder: Forward flexion 170, abduction 160. External rotation 40. Internal rotation lumbar spine. Resisted abduction 4/5. External rotation 4+/5 internal rotation 5/5. + empty can, thacker, neer. 2+ radial pulse    DIAGNOSTIC IMAGING:     I have reviewed prior imaging studies. ASSESSMENT/PLAN:   1. Glenohumeral arthritis, left    2. Tendinopathy of left rotator cuff         We discussed left glenohumeral joint injection today, risks and benefits of injection including pain with injection, bleeding, damage to surrounding structures, infection, elevation in blood glucose, steroid flare.  They wished to proceed with

## 2023-05-22 DIAGNOSIS — M17.12 PRIMARY OSTEOARTHRITIS OF LEFT KNEE: ICD-10-CM

## 2023-05-23 ENCOUNTER — OFFICE VISIT (OUTPATIENT)
Dept: ORTHOPEDIC SURGERY | Age: 73
End: 2023-05-23

## 2023-05-23 DIAGNOSIS — Z01.818 ENCOUNTER FOR PRE-OPERATIVE EXAMINATION: ICD-10-CM

## 2023-05-23 DIAGNOSIS — M17.12 PRIMARY OSTEOARTHRITIS OF LEFT KNEE: Primary | ICD-10-CM

## 2023-05-23 PROCEDURE — PREOPEXAM PRE-OP EXAM: Performed by: PHYSICIAN ASSISTANT

## 2023-05-23 RX ORDER — CELECOXIB 200 MG/1
200 CAPSULE ORAL 2 TIMES DAILY
Qty: 60 CAPSULE | Refills: 0 | Status: SHIPPED | OUTPATIENT
Start: 2023-05-30 | End: 2023-06-29

## 2023-05-23 RX ORDER — ASPIRIN 81 MG/1
81 TABLET ORAL EVERY 12 HOURS
Qty: 70 TABLET | Refills: 0 | Status: SHIPPED | OUTPATIENT
Start: 2023-05-30 | End: 2023-07-04

## 2023-05-23 RX ORDER — PROMETHAZINE HYDROCHLORIDE 12.5 MG/1
12.5 TABLET ORAL 4 TIMES DAILY PRN
Qty: 20 TABLET | Refills: 2 | Status: SHIPPED | OUTPATIENT
Start: 2023-05-30

## 2023-05-23 RX ORDER — OXYCODONE HYDROCHLORIDE 5 MG/1
5-10 TABLET ORAL
Qty: 60 TABLET | Refills: 0 | Status: SHIPPED | OUTPATIENT
Start: 2023-05-30 | End: 2023-06-04

## 2023-05-23 RX ORDER — METHOCARBAMOL 750 MG/1
750 TABLET, FILM COATED ORAL 3 TIMES DAILY PRN
Qty: 30 TABLET | Refills: 0 | Status: SHIPPED | OUTPATIENT
Start: 2023-05-30 | End: 2023-06-09

## 2023-05-23 NOTE — PROGRESS NOTES
[id number] [facility]  Pre Operative History and Physical Exam    Patient ID:  Abbey Bustillos  [id number]  88 y.o.  1950    Today: May 23, 2023           CC: Left knee pain    HPI:   The patient has end stage arthritis of the left knee. The patient was evaluated and examined during a consultation prior to this office visit. There have been no changes to the patient's orthopedic condition since the initial consultation. The patient has failed previous conservative treatment for this condition including antiinflammatories , and lifestyle modifications. The necessity for joint replacement is present.  The patient will be admitted the day of surgery for left knee replacement    Past Medical/Surgical History:  Past Medical History:   Diagnosis Date    Arthritis     OA    Cancer ([facility] [address] 2003    prostate - seed implants    GERD (gastroesophageal reflux disease)     controlled with meds    Gout     Hypertension     controlled with meds     Past Surgical History:   Procedure Laterality Date    KNEE ARTHROSCOPY Bilateral     TONSILLECTOMY  age 10        Allergies: No Known Allergies     Physical Exam:   General: NAD, Alert, Oriented, Appears their stated age     [de-identified]: NC/AT    Skin: No rashes, lesions or wounds seen      Psych: normal affect      Heart: Regular Rate, Rhythm     Lungs: unlabored respirations, no wheezing    Abdomen: Soft and non-distended     Ortho: Pain with limited ROM of the left knee    Neuro: no focal defects, moving extremities equally    Lymph: no lymphadenopathy     Meds:   Current Outpatient Medications   Medication Sig    carvedilol (COREG) 6.25 MG tablet 2 times daily    colchicine (COLCRYS) 0.6 MG tablet Take 0.6 mg by mouth daily    esomeprazole (NEXIUM) 20 MG delayed release capsule Take 1 capsule by mouth daily    allopurinol (ZYLOPRIM) 100 MG tablet Take 1 tablet by mouth 2 times daily    amLODIPine (NORVASC) 10 MG tablet Take 1 tablet by mouth    benazepril (LOTENSIN) 40 MG

## 2023-05-30 ENCOUNTER — ANESTHESIA EVENT (OUTPATIENT)
Dept: SURGERY | Age: 73
End: 2023-05-30
Payer: MEDICARE

## 2023-05-30 NOTE — DISCHARGE INSTRUCTIONS
you how to take care of this. If you did not get instructions, follow this general advice: If you have strips of tape on the cut the doctor made, leave the tape on for a week or until it falls off. If you have stitches or staples, your doctor will tell you when to come back to have them removed. If you have skin glue on the cut, leave it on until it falls off. Skin glue is also called skin adhesive or liquid stitches. Change the bandage every day. Wash the area daily with warm water, and pat it dry. Don't use hydrogen peroxide or alcohol. They can slow healing. You may cover the area with a gauze bandage if it oozes fluid or rubs against clothing. You may shower 24 to 48 hours after surgery. Pat the incision dry. Don't swim or take a bath for the first 2 weeks, or until your doctor tells you it is okay. Exercise    Your rehab program will give you a number of exercises to do to help you get back your knee's range of motion and strength. Always do them as your therapist tells you. Ice    For pain and swelling, put ice or a cold pack on the area for 10 to 20 minutes at a time. Put a thin cloth between the ice and your skin. If your doctor recommended cold therapy using a portable machine, follow the instructions that came with the machine. Other instructions    Wear compression stockings if your doctor told you to. These may help to prevent blood clots. Your doctor will tell you how long you need to keep wearing the compression stockings. Carry a medical alert card that says you have an artificial joint. You have metal pieces in your knee. These may set off some airport metal detectors. Follow-up care is a key part of your treatment and safety. Be sure to make and go to all appointments, and call your doctor if you are having problems. It's also a good idea to know your test results and keep a list of the medicines you take. When should you call for help?    Call 911 anytime you think you may

## 2023-05-31 ENCOUNTER — HOSPITAL ENCOUNTER (OUTPATIENT)
Age: 73
Discharge: HOME | End: 2023-05-31
Attending: ORTHOPAEDIC SURGERY | Admitting: ORTHOPAEDIC SURGERY
Payer: MEDICARE

## 2023-05-31 ENCOUNTER — ANESTHESIA (OUTPATIENT)
Dept: SURGERY | Age: 73
End: 2023-05-31
Payer: MEDICARE

## 2023-05-31 ENCOUNTER — HOME HEALTH ADMISSION (OUTPATIENT)
Dept: HOME HEALTH SERVICES | Facility: HOME HEALTH | Age: 73
End: 2023-05-31
Payer: MEDICARE

## 2023-05-31 VITALS
WEIGHT: 190.1 LBS | SYSTOLIC BLOOD PRESSURE: 148 MMHG | RESPIRATION RATE: 18 BRPM | DIASTOLIC BLOOD PRESSURE: 96 MMHG | TEMPERATURE: 97.6 F | BODY MASS INDEX: 29.84 KG/M2 | OXYGEN SATURATION: 92 % | HEART RATE: 83 BPM | HEIGHT: 67 IN

## 2023-05-31 DIAGNOSIS — M17.12 PRIMARY OSTEOARTHRITIS OF LEFT KNEE: ICD-10-CM

## 2023-05-31 PROBLEM — Z96.652 STATUS POST LEFT KNEE REPLACEMENT: Status: ACTIVE | Noted: 2023-05-31

## 2023-05-31 PROCEDURE — 7100000000 HC PACU RECOVERY - FIRST 15 MIN: Performed by: ORTHOPAEDIC SURGERY

## 2023-05-31 PROCEDURE — 97165 OT EVAL LOW COMPLEX 30 MIN: CPT

## 2023-05-31 PROCEDURE — 6360000002 HC RX W HCPCS

## 2023-05-31 PROCEDURE — 6360000002 HC RX W HCPCS: Performed by: PHYSICIAN ASSISTANT

## 2023-05-31 PROCEDURE — 97161 PT EVAL LOW COMPLEX 20 MIN: CPT

## 2023-05-31 PROCEDURE — 2500000003 HC RX 250 WO HCPCS

## 2023-05-31 PROCEDURE — 6360000002 HC RX W HCPCS: Performed by: STUDENT IN AN ORGANIZED HEALTH CARE EDUCATION/TRAINING PROGRAM

## 2023-05-31 PROCEDURE — 3600000005 HC SURGERY LEVEL 5 BASE: Performed by: ORTHOPAEDIC SURGERY

## 2023-05-31 PROCEDURE — 3600000015 HC SURGERY LEVEL 5 ADDTL 15MIN: Performed by: ORTHOPAEDIC SURGERY

## 2023-05-31 PROCEDURE — 3700000001 HC ADD 15 MINUTES (ANESTHESIA): Performed by: ORTHOPAEDIC SURGERY

## 2023-05-31 PROCEDURE — 64447 NJX AA&/STRD FEMORAL NRV IMG: CPT | Performed by: STUDENT IN AN ORGANIZED HEALTH CARE EDUCATION/TRAINING PROGRAM

## 2023-05-31 PROCEDURE — 2500000003 HC RX 250 WO HCPCS: Performed by: ORTHOPAEDIC SURGERY

## 2023-05-31 PROCEDURE — 2720000010 HC SURG SUPPLY STERILE: Performed by: ORTHOPAEDIC SURGERY

## 2023-05-31 PROCEDURE — C1713 ANCHOR/SCREW BN/BN,TIS/BN: HCPCS | Performed by: ORTHOPAEDIC SURGERY

## 2023-05-31 PROCEDURE — 2580000003 HC RX 258: Performed by: STUDENT IN AN ORGANIZED HEALTH CARE EDUCATION/TRAINING PROGRAM

## 2023-05-31 PROCEDURE — 97530 THERAPEUTIC ACTIVITIES: CPT

## 2023-05-31 PROCEDURE — 6370000000 HC RX 637 (ALT 250 FOR IP): Performed by: PHYSICIAN ASSISTANT

## 2023-05-31 PROCEDURE — 6370000000 HC RX 637 (ALT 250 FOR IP): Performed by: ORTHOPAEDIC SURGERY

## 2023-05-31 PROCEDURE — 6370000000 HC RX 637 (ALT 250 FOR IP): Performed by: STUDENT IN AN ORGANIZED HEALTH CARE EDUCATION/TRAINING PROGRAM

## 2023-05-31 PROCEDURE — 2709999900 HC NON-CHARGEABLE SUPPLY: Performed by: ORTHOPAEDIC SURGERY

## 2023-05-31 PROCEDURE — 3700000000 HC ANESTHESIA ATTENDED CARE: Performed by: ORTHOPAEDIC SURGERY

## 2023-05-31 PROCEDURE — 6360000002 HC RX W HCPCS: Performed by: ORTHOPAEDIC SURGERY

## 2023-05-31 PROCEDURE — 2500000003 HC RX 250 WO HCPCS: Performed by: STUDENT IN AN ORGANIZED HEALTH CARE EDUCATION/TRAINING PROGRAM

## 2023-05-31 PROCEDURE — 7100000001 HC PACU RECOVERY - ADDTL 15 MIN: Performed by: ORTHOPAEDIC SURGERY

## 2023-05-31 PROCEDURE — C1776 JOINT DEVICE (IMPLANTABLE): HCPCS | Performed by: ORTHOPAEDIC SURGERY

## 2023-05-31 PROCEDURE — 97535 SELF CARE MNGMENT TRAINING: CPT

## 2023-05-31 DEVICE — CRUCIATE RETAINING FEMORAL
Type: IMPLANTABLE DEVICE | Site: KNEE | Status: FUNCTIONAL
Brand: TRIATHLON

## 2023-05-31 DEVICE — PATELLA
Type: IMPLANTABLE DEVICE | Site: KNEE | Status: FUNCTIONAL
Brand: TRIATHLON

## 2023-05-31 DEVICE — TIBIAL BEARING INSERT - CS
Type: IMPLANTABLE DEVICE | Site: KNEE | Status: FUNCTIONAL
Brand: TRIATHLON

## 2023-05-31 DEVICE — TIBIAL COMPONENT
Type: IMPLANTABLE DEVICE | Site: KNEE | Status: FUNCTIONAL
Brand: TRIATHLON

## 2023-05-31 DEVICE — DEPUY CMW 2 FAST SET BONE CEMENT 20G: Type: IMPLANTABLE DEVICE | Status: FUNCTIONAL

## 2023-05-31 DEVICE — COMPONENT TOT KNEE CAPPED PRIMARY K2STRYKER] STRYKER CORP]: Type: IMPLANTABLE DEVICE | Status: FUNCTIONAL

## 2023-05-31 RX ORDER — SODIUM CHLORIDE 0.9 % (FLUSH) 0.9 %
5-40 SYRINGE (ML) INJECTION PRN
Status: DISCONTINUED | OUTPATIENT
Start: 2023-05-31 | End: 2023-05-31 | Stop reason: HOSPADM

## 2023-05-31 RX ORDER — OXYCODONE HYDROCHLORIDE 5 MG/1
10 TABLET ORAL EVERY 4 HOURS PRN
Status: DISCONTINUED | OUTPATIENT
Start: 2023-05-31 | End: 2023-05-31 | Stop reason: HOSPADM

## 2023-05-31 RX ORDER — SENNA AND DOCUSATE SODIUM 50; 8.6 MG/1; MG/1
1 TABLET, FILM COATED ORAL 2 TIMES DAILY
Status: DISCONTINUED | OUTPATIENT
Start: 2023-05-31 | End: 2023-05-31 | Stop reason: HOSPADM

## 2023-05-31 RX ORDER — ONDANSETRON 2 MG/ML
INJECTION INTRAMUSCULAR; INTRAVENOUS PRN
Status: DISCONTINUED | OUTPATIENT
Start: 2023-05-31 | End: 2023-05-31 | Stop reason: SDUPTHER

## 2023-05-31 RX ORDER — SODIUM CHLORIDE, SODIUM LACTATE, POTASSIUM CHLORIDE, CALCIUM CHLORIDE 600; 310; 30; 20 MG/100ML; MG/100ML; MG/100ML; MG/100ML
INJECTION, SOLUTION INTRAVENOUS CONTINUOUS
Status: DISCONTINUED | OUTPATIENT
Start: 2023-05-31 | End: 2023-05-31 | Stop reason: HOSPADM

## 2023-05-31 RX ORDER — MIDAZOLAM HYDROCHLORIDE 2 MG/2ML
2 INJECTION, SOLUTION INTRAMUSCULAR; INTRAVENOUS
Status: COMPLETED | OUTPATIENT
Start: 2023-05-31 | End: 2023-05-31

## 2023-05-31 RX ORDER — ACETAMINOPHEN 500 MG
1000 TABLET ORAL ONCE
Status: COMPLETED | OUTPATIENT
Start: 2023-05-31 | End: 2023-05-31

## 2023-05-31 RX ORDER — LABETALOL HYDROCHLORIDE 5 MG/ML
10 INJECTION, SOLUTION INTRAVENOUS
Status: DISCONTINUED | OUTPATIENT
Start: 2023-05-31 | End: 2023-05-31 | Stop reason: HOSPADM

## 2023-05-31 RX ORDER — EPHEDRINE SULFATE/0.9% NACL/PF 50 MG/5 ML
SYRINGE (ML) INTRAVENOUS PRN
Status: DISCONTINUED | OUTPATIENT
Start: 2023-05-31 | End: 2023-05-31 | Stop reason: SDUPTHER

## 2023-05-31 RX ORDER — SODIUM CHLORIDE 0.9 % (FLUSH) 0.9 %
5-40 SYRINGE (ML) INJECTION EVERY 12 HOURS SCHEDULED
Status: DISCONTINUED | OUTPATIENT
Start: 2023-05-31 | End: 2023-05-31 | Stop reason: HOSPADM

## 2023-05-31 RX ORDER — VANCOMYCIN HYDROCHLORIDE 1 G/20ML
INJECTION, POWDER, LYOPHILIZED, FOR SOLUTION INTRAVENOUS PRN
Status: DISCONTINUED | OUTPATIENT
Start: 2023-05-31 | End: 2023-05-31 | Stop reason: ALTCHOICE

## 2023-05-31 RX ORDER — IPRATROPIUM BROMIDE AND ALBUTEROL SULFATE 2.5; .5 MG/3ML; MG/3ML
1 SOLUTION RESPIRATORY (INHALATION)
Status: DISCONTINUED | OUTPATIENT
Start: 2023-05-31 | End: 2023-05-31 | Stop reason: HOSPADM

## 2023-05-31 RX ORDER — ACETAMINOPHEN 500 MG
1000 TABLET ORAL EVERY 6 HOURS
Status: DISCONTINUED | OUTPATIENT
Start: 2023-05-31 | End: 2023-05-31 | Stop reason: HOSPADM

## 2023-05-31 RX ORDER — KETOROLAC TROMETHAMINE 30 MG/ML
INJECTION, SOLUTION INTRAMUSCULAR; INTRAVENOUS PRN
Status: DISCONTINUED | OUTPATIENT
Start: 2023-05-31 | End: 2023-05-31 | Stop reason: ALTCHOICE

## 2023-05-31 RX ORDER — PROPOFOL 10 MG/ML
INJECTION, EMULSION INTRAVENOUS CONTINUOUS PRN
Status: DISCONTINUED | OUTPATIENT
Start: 2023-05-31 | End: 2023-05-31 | Stop reason: SDUPTHER

## 2023-05-31 RX ORDER — HALOPERIDOL 5 MG/ML
1 INJECTION INTRAMUSCULAR
Status: DISCONTINUED | OUTPATIENT
Start: 2023-05-31 | End: 2023-05-31 | Stop reason: HOSPADM

## 2023-05-31 RX ORDER — ROPIVACAINE HYDROCHLORIDE 2 MG/ML
INJECTION, SOLUTION EPIDURAL; INFILTRATION; PERINEURAL PRN
Status: DISCONTINUED | OUTPATIENT
Start: 2023-05-31 | End: 2023-05-31 | Stop reason: ALTCHOICE

## 2023-05-31 RX ORDER — SODIUM CHLORIDE 9 MG/ML
INJECTION, SOLUTION INTRAVENOUS PRN
Status: DISCONTINUED | OUTPATIENT
Start: 2023-05-31 | End: 2023-05-31 | Stop reason: HOSPADM

## 2023-05-31 RX ORDER — OXYCODONE HYDROCHLORIDE 5 MG/1
5 TABLET ORAL PRN
Status: COMPLETED | OUTPATIENT
Start: 2023-05-31 | End: 2023-05-31

## 2023-05-31 RX ORDER — DIPHENHYDRAMINE HYDROCHLORIDE 50 MG/ML
25 INJECTION INTRAMUSCULAR; INTRAVENOUS EVERY 6 HOURS PRN
Status: DISCONTINUED | OUTPATIENT
Start: 2023-05-31 | End: 2023-05-31 | Stop reason: HOSPADM

## 2023-05-31 RX ORDER — ACETAMINOPHEN 325 MG/1
650 TABLET ORAL EVERY 6 HOURS PRN
COMMUNITY
End: 2023-06-01 | Stop reason: SDUPTHER

## 2023-05-31 RX ORDER — FENTANYL CITRATE 50 UG/ML
100 INJECTION, SOLUTION INTRAMUSCULAR; INTRAVENOUS
Status: COMPLETED | OUTPATIENT
Start: 2023-05-31 | End: 2023-05-31

## 2023-05-31 RX ORDER — FENTANYL CITRATE 50 UG/ML
25 INJECTION, SOLUTION INTRAMUSCULAR; INTRAVENOUS
Status: COMPLETED | OUTPATIENT
Start: 2023-05-31 | End: 2023-05-31

## 2023-05-31 RX ORDER — HYDRALAZINE HYDROCHLORIDE 20 MG/ML
10 INJECTION INTRAMUSCULAR; INTRAVENOUS
Status: DISCONTINUED | OUTPATIENT
Start: 2023-05-31 | End: 2023-05-31 | Stop reason: HOSPADM

## 2023-05-31 RX ORDER — PROCHLORPERAZINE EDISYLATE 5 MG/ML
5 INJECTION INTRAMUSCULAR; INTRAVENOUS
Status: DISCONTINUED | OUTPATIENT
Start: 2023-05-31 | End: 2023-05-31 | Stop reason: HOSPADM

## 2023-05-31 RX ORDER — ROPIVACAINE HYDROCHLORIDE 2 MG/ML
INJECTION, SOLUTION EPIDURAL; INFILTRATION; PERINEURAL
Status: COMPLETED | OUTPATIENT
Start: 2023-05-31 | End: 2023-05-31

## 2023-05-31 RX ORDER — ONDANSETRON 4 MG/1
4 TABLET, ORALLY DISINTEGRATING ORAL EVERY 8 HOURS PRN
Status: DISCONTINUED | OUTPATIENT
Start: 2023-05-31 | End: 2023-05-31 | Stop reason: HOSPADM

## 2023-05-31 RX ORDER — LABETALOL HYDROCHLORIDE 5 MG/ML
INJECTION, SOLUTION INTRAVENOUS PRN
Status: DISCONTINUED | OUTPATIENT
Start: 2023-05-31 | End: 2023-05-31 | Stop reason: SDUPTHER

## 2023-05-31 RX ORDER — DIPHENHYDRAMINE HCL 25 MG
25 CAPSULE ORAL EVERY 6 HOURS PRN
Status: DISCONTINUED | OUTPATIENT
Start: 2023-05-31 | End: 2023-05-31 | Stop reason: HOSPADM

## 2023-05-31 RX ORDER — ASPIRIN 81 MG/1
81 TABLET ORAL 2 TIMES DAILY
Status: DISCONTINUED | OUTPATIENT
Start: 2023-05-31 | End: 2023-05-31 | Stop reason: HOSPADM

## 2023-05-31 RX ORDER — TRANEXAMIC ACID 100 MG/ML
INJECTION, SOLUTION INTRAVENOUS PRN
Status: DISCONTINUED | OUTPATIENT
Start: 2023-05-31 | End: 2023-05-31 | Stop reason: SDUPTHER

## 2023-05-31 RX ORDER — HYDROMORPHONE HYDROCHLORIDE 1 MG/ML
1 INJECTION, SOLUTION INTRAMUSCULAR; INTRAVENOUS; SUBCUTANEOUS
Status: DISCONTINUED | OUTPATIENT
Start: 2023-05-31 | End: 2023-05-31 | Stop reason: HOSPADM

## 2023-05-31 RX ORDER — DEXAMETHASONE SODIUM PHOSPHATE 10 MG/ML
INJECTION INTRAMUSCULAR; INTRAVENOUS PRN
Status: DISCONTINUED | OUTPATIENT
Start: 2023-05-31 | End: 2023-05-31 | Stop reason: SDUPTHER

## 2023-05-31 RX ORDER — DIPHENHYDRAMINE HYDROCHLORIDE 50 MG/ML
12.5 INJECTION INTRAMUSCULAR; INTRAVENOUS
Status: DISCONTINUED | OUTPATIENT
Start: 2023-05-31 | End: 2023-05-31 | Stop reason: HOSPADM

## 2023-05-31 RX ORDER — ONDANSETRON 2 MG/ML
4 INJECTION INTRAMUSCULAR; INTRAVENOUS EVERY 6 HOURS PRN
Status: DISCONTINUED | OUTPATIENT
Start: 2023-05-31 | End: 2023-05-31 | Stop reason: HOSPADM

## 2023-05-31 RX ORDER — OXYCODONE HYDROCHLORIDE 5 MG/1
10 TABLET ORAL PRN
Status: COMPLETED | OUTPATIENT
Start: 2023-05-31 | End: 2023-05-31

## 2023-05-31 RX ORDER — TRANEXAMIC ACID 100 MG/ML
INJECTION, SOLUTION INTRAVENOUS PRN
Status: DISCONTINUED | OUTPATIENT
Start: 2023-05-31 | End: 2023-05-31 | Stop reason: ALTCHOICE

## 2023-05-31 RX ORDER — LIDOCAINE HYDROCHLORIDE 10 MG/ML
1 INJECTION, SOLUTION INFILTRATION; PERINEURAL
Status: DISCONTINUED | OUTPATIENT
Start: 2023-05-31 | End: 2023-05-31 | Stop reason: HOSPADM

## 2023-05-31 RX ORDER — MIDAZOLAM HYDROCHLORIDE 1 MG/ML
INJECTION INTRAMUSCULAR; INTRAVENOUS PRN
Status: DISCONTINUED | OUTPATIENT
Start: 2023-05-31 | End: 2023-05-31 | Stop reason: SDUPTHER

## 2023-05-31 RX ADMIN — Medication 5 MG: at 08:01

## 2023-05-31 RX ADMIN — DEXAMETHASONE SODIUM PHOSPHATE 10 MG: 10 INJECTION INTRAMUSCULAR; INTRAVENOUS at 07:17

## 2023-05-31 RX ADMIN — HYDROMORPHONE HYDROCHLORIDE 0.5 MG: 1 INJECTION, SOLUTION INTRAMUSCULAR; INTRAVENOUS; SUBCUTANEOUS at 09:44

## 2023-05-31 RX ADMIN — PROPOFOL 75 MCG/KG/MIN: 10 INJECTION, EMULSION INTRAVENOUS at 07:11

## 2023-05-31 RX ADMIN — TRANEXAMIC ACID 1000 MG: 100 INJECTION, SOLUTION INTRAVENOUS at 07:00

## 2023-05-31 RX ADMIN — OXYCODONE HYDROCHLORIDE 10 MG: 5 TABLET ORAL at 09:57

## 2023-05-31 RX ADMIN — OXYCODONE HYDROCHLORIDE 10 MG: 5 TABLET ORAL at 11:02

## 2023-05-31 RX ADMIN — MEPIVACAINE HYDROCHLORIDE 60 MG: 20 INJECTION, SOLUTION EPIDURAL; INFILTRATION at 07:03

## 2023-05-31 RX ADMIN — HYDROMORPHONE HYDROCHLORIDE 0.5 MG: 1 INJECTION, SOLUTION INTRAMUSCULAR; INTRAVENOUS; SUBCUTANEOUS at 09:36

## 2023-05-31 RX ADMIN — ACETAMINOPHEN 1000 MG: 500 TABLET, FILM COATED ORAL at 11:02

## 2023-05-31 RX ADMIN — SODIUM CHLORIDE, SODIUM LACTATE, POTASSIUM CHLORIDE, AND CALCIUM CHLORIDE: 600; 310; 30; 20 INJECTION, SOLUTION INTRAVENOUS at 07:32

## 2023-05-31 RX ADMIN — MIDAZOLAM 1 MG: 1 INJECTION INTRAMUSCULAR; INTRAVENOUS at 07:08

## 2023-05-31 RX ADMIN — Medication 10 MG: at 08:28

## 2023-05-31 RX ADMIN — Medication 5 MG: at 07:51

## 2023-05-31 RX ADMIN — SODIUM CHLORIDE, SODIUM LACTATE, POTASSIUM CHLORIDE, AND CALCIUM CHLORIDE 125 ML/HR: 600; 310; 30; 20 INJECTION, SOLUTION INTRAVENOUS at 06:16

## 2023-05-31 RX ADMIN — Medication 2000 MG: at 07:04

## 2023-05-31 RX ADMIN — PROPOFOL 75 MG: 10 INJECTION, EMULSION INTRAVENOUS at 07:46

## 2023-05-31 RX ADMIN — ROPIVACAINE HYDROCHLORIDE 20 ML: 2 INJECTION, SOLUTION EPIDURAL; INFILTRATION at 06:37

## 2023-05-31 RX ADMIN — DEXAMETHASONE SODIUM PHOSPHATE 4 MG: 4 INJECTION, SOLUTION INTRAMUSCULAR; INTRAVENOUS at 06:37

## 2023-05-31 RX ADMIN — LABETALOL HYDROCHLORIDE 5 MG: 5 INJECTION INTRAVENOUS at 07:39

## 2023-05-31 RX ADMIN — ACETAMINOPHEN 1000 MG: 500 TABLET, FILM COATED ORAL at 06:16

## 2023-05-31 RX ADMIN — FENTANYL CITRATE 100 MCG: 50 INJECTION INTRAMUSCULAR; INTRAVENOUS at 06:37

## 2023-05-31 RX ADMIN — MIDAZOLAM 2 MG: 1 INJECTION INTRAMUSCULAR; INTRAVENOUS at 06:37

## 2023-05-31 RX ADMIN — ONDANSETRON 4 MG: 2 INJECTION INTRAMUSCULAR; INTRAVENOUS at 07:17

## 2023-05-31 RX ADMIN — MIDAZOLAM 1 MG: 1 INJECTION INTRAMUSCULAR; INTRAVENOUS at 07:03

## 2023-05-31 RX ADMIN — Medication 3 AMPULE: at 06:18

## 2023-05-31 ASSESSMENT — PAIN DESCRIPTION - LOCATION
LOCATION: KNEE

## 2023-05-31 ASSESSMENT — PAIN DESCRIPTION - DESCRIPTORS
DESCRIPTORS: SORE
DESCRIPTORS: ACHING
DESCRIPTORS: ACHING
DESCRIPTORS: ACHING;SHARP;SHOOTING

## 2023-05-31 ASSESSMENT — PAIN SCALES - GENERAL
PAINLEVEL_OUTOF10: 5
PAINLEVEL_OUTOF10: 7
PAINLEVEL_OUTOF10: 0
PAINLEVEL_OUTOF10: 9
PAINLEVEL_OUTOF10: 3
PAINLEVEL_OUTOF10: 0
PAINLEVEL_OUTOF10: 6
PAINLEVEL_OUTOF10: 7
PAINLEVEL_OUTOF10: 5

## 2023-05-31 ASSESSMENT — KOOS JR
HOW SEVERE IS YOUR KNEE STIFFNESS AFTER FIRST WAKING IN MORNING: 1
STANDING UPRIGHT: 1
BENDING TO THE FLOOR TO PICK UP OBJECT: 1
RISING FROM SITTING: 1
STRAIGHTENING KNEE FULLY: 1
GOING UP OR DOWN STAIRS: 1
KOOS JR TOTAL INTERVAL SCORE: 68.284
TWISING OR PIVOTING ON KNEE: 1

## 2023-05-31 ASSESSMENT — PAIN DESCRIPTION - ORIENTATION
ORIENTATION: LEFT
ORIENTATION: LEFT;PROXIMAL

## 2023-05-31 ASSESSMENT — PAIN - FUNCTIONAL ASSESSMENT: PAIN_FUNCTIONAL_ASSESSMENT: 0-10

## 2023-05-31 ASSESSMENT — PAIN DESCRIPTION - PAIN TYPE: TYPE: ACUTE PAIN;SURGICAL PAIN

## 2023-05-31 NOTE — ANESTHESIA PRE PROCEDURE
 Gout     Hypertension     controlled with meds       Past Surgical History:        Procedure Laterality Date    KNEE ARTHROSCOPY Bilateral     TONSILLECTOMY  age 10       Social History:    Social History     Tobacco Use    Smoking status: Former     Packs/day: 2.00     Types: Cigarettes     Quit date: 6/1/2008     Years since quitting: 15.0    Smokeless tobacco: Never   Substance Use Topics    Alcohol use: Yes     Alcohol/week: 10.0 standard drinks                                Counseling given: Not Answered      Vital Signs (Current):   Vitals:    05/31/23 0528 05/31/23 0630 05/31/23 0639   BP: (!) 163/103 (!) 171/95 (!) 166/96   Pulse: 93 72 83   Resp: 18 18 18   Temp: 97.3 °F (36.3 °C)     TempSrc: Temporal     SpO2: 95% 97% 94%   Weight: 190 lb 1.6 oz (86.2 kg)     Height: 5' 7\" (1.702 m)                                                BP Readings from Last 3 Encounters:   05/31/23 (!) 166/96   05/09/23 (!) 153/96       NPO Status: Time of last liquid consumption: 2100                        Time of last solid consumption: 1900                        Date of last liquid consumption: 05/30/23                        Date of last solid food consumption: 05/30/23    BMI:   Wt Readings from Last 3 Encounters:   05/31/23 190 lb 1.6 oz (86.2 kg)   05/09/23 191 lb 12.8 oz (87 kg)   03/28/23 185 lb (83.9 kg)     Body mass index is 29.77 kg/m².     CBC:   Lab Results   Component Value Date/Time    WBC 5.1 05/09/2023 10:42 AM    RBC 4.67 05/09/2023 10:42 AM    HGB 14.3 05/09/2023 10:42 AM    HCT 42.7 05/09/2023 10:42 AM    MCV 91.4 05/09/2023 10:42 AM    RDW 15.0 05/09/2023 10:42 AM     05/09/2023 10:42 AM       CMP:   Lab Results   Component Value Date/Time     05/09/2023 10:42 AM    K 3.2 05/09/2023 10:42 AM     05/09/2023 10:42 AM    CO2 27 05/09/2023 10:42 AM    BUN 13 05/09/2023 10:42 AM    CREATININE 0.76 05/09/2023 10:42 AM    LABGLOM >60 05/09/2023 10:42 AM    GLUCOSE 92 05/09/2023 10:42

## 2023-05-31 NOTE — CARE COORDINATION
Patient is a 67y.o. year old male admitted for Left TKA . Patient plans to return home on discharge. Order received to arrange home health. Patient without preference towards agency. Referral sent to [facility]. . Patient requesting we arrange a walker. Pt without preference towards provider. Referral sent to [address] [place] [address] Str. delivered to the hospital room prior to discharge. Will follow until discharge. 05/31/23 1231   Service Assessment   Patient Orientation Alert and [place] At/After Discharge   Transition of Care Consult (CM Consult) [address] [facility] [place] Discharge Home Health;PT   Mode of Transport at Discharge Self   Condition of Participation: Discharge Planning   The Plan for Transition of Care is related to the following treatment goals: improve mobility   The Patient and/or Patient Representative was provided with a Choice of Provider? Patient   The Patient and/Or Patient Representative agree with the Discharge Plan? Yes   Freedom of Choice list was provided with basic dialogue that supports the patient's individualized plan of care/goals, treatment preferences, and shares the quality data associated with the providers?   Yes

## 2023-05-31 NOTE — OP NOTE
place. Hayward Closs knee was placed through range of motion and noted to be stable as mentioned above with the trail components. The wound was dry, therefore no drain was used. The operative knee was injected with 60 cc of Naropin and 1 cc of 30 mg of Toradol. The knee was then soaked with a diluted betadine solution for approximately 3 min. This was then thoroughly irrigated. The capsular layer was closed using a #1 Stratafix suture and interrupted #2 Ethibond suture. Then, 1 gram (100 mg/ml) of Transexamic Acid was injected into the joint space. The subcutaneous layers were closed using 2-0 Monocryl. Finally the skin was closed using 3-0 Monocryl and staples which were applied in occlusive fashion and sterile bandage applied. An Iceman cryo pad was applied on the operative leg. Sponge count and needle counts were correct. Hayward Closs left the operating room     Implants:   Implant Name Type Inv.  Item Serial No.  Lot No. LRB No. Used Action   CEMENT BNE 20GM HALF DOSE PMMA VISC RADPQ FAST - [id number]  CEMENT BNE 20GM HALF DOSE PMMA VISC RADPQ FAST  [employer]- [id number] Left 1 Implanted   COMPONENT FEM SZ 4 L KNEE CRUCE RET CEMENTLESS BEAD W/ ALECIA - [id number]  COMPONENT FEM SZ 4 L KNEE CRUCE RET CEMENTLESS BEAD W/ ALECIA  [employer]- [id number] Left 1 Implanted   BASEPLATE TIB SZ 5 [id number] ML74MM KNEE TRITANIUM 4 CRUCFRM - [id number]  BASEPLATE TIB SZ 5 [id number] ML74MM KNEE TRITANIUM 4 CRUCFRM  [employer]- [id number] Left 1 Implanted   COMPONENT PAT HAB93QU [id number] SUP INFERIOR ASYM TRIATHLON - [id number]  COMPONENT PAT [id number] SUP INFERIOR ASYM TRIATHLON  [employer]- [id number] Left 1 Implanted   INSERT TIB CNDYL STBL 5 9 MM KNEE 5/PK X3 TRIATHLON - [id number]  INSERT TIB CNDYL STBL 5 9 MM KNEE 5/PK X3 TRIATHLON  [employer] HOW-WD [id number] Left 1 Implanted         Signed By: Librado Strong MD   5/31/2023,

## 2023-05-31 NOTE — ANESTHESIA PROCEDURE NOTES
Peripheral Block    Patient location during procedure: pre-op  Reason for block: post-op pain management and at surgeon's request  Start time: 5/31/2023 6:37 AM  End time: 5/31/2023 6:38 AM  Staffing  Performed: anesthesiologist   Anesthesiologist: Carina Dawkins MD  Preanesthetic Checklist  Completed: patient identified, IV checked, site marked, risks and benefits discussed, surgical/procedural consents, equipment checked, pre-op evaluation, timeout performed, anesthesia consent given, oxygen available and monitors applied/VS acknowledged  Peripheral Block   Patient position: supine  Prep: ChloraPrep  Provider prep: mask  Patient monitoring: cardiac monitor, continuous pulse ox, frequent blood pressure checks, IV access, oxygen and responsive to questions  Block type: Femoral  Adductor canal  Laterality: left  Injection technique: single-shot  Guidance: ultrasound guided    Needle   Needle type: insulated echogenic nerve stimulator needle   Needle gauge: 20 G  Needle localization: ultrasound guidance  Assessment   Injection assessment: negative aspiration for heme, no paresthesia on injection, local visualized surrounding nerve on ultrasound and no intravascular symptoms  Paresthesia pain: none  Slow fractionated injection: yes  Hemodynamics: stable  Real-time US image taken/store: yes  Outcomes: patient tolerated procedure well and uncomplicated    Additional Notes  Ultrasound image taken/on chart.   Medications Administered  dexamethasone (DECADRON) injection 4 mg/mL - Perineural   4 mg - 5/31/2023 6:37:00 AM  ropivacaine (NAROPIN) injection 0.2% - Perineural   20 mL - 5/31/2023 6:37:00 AM

## 2023-05-31 NOTE — RT PROTOCOL NOTE
Respiratory Care Services     Policy Number: [id number]    Title: Oxygen Protocol    Effective Date: 01/1996    Revised Date: 06/2013, 02/29/2016, 4/2018, 7/2019    Reviewed Date: 05/2014, 03/2015, 06/2017, 11/2020        I. Policy: The Oxygen Protocol will be initiated for all patients upon written order from physician for administration of oxygen therapy or if a patient is found to have an oxygen saturation of 88% or less. Special consideration: the goal of oxygen therapy for COPD patients is to maintain oxygen saturation between 88% - 92% to comply with GOLD Guidelines. II. Purpose: To provide protocol driven respiratory therapy for the administration of oxygen at concentrations greater than that in ambient air with the intent of treating or preventing the symptoms and manifestations of hypoxia. III. Responsibility: Director Respiratory Care Services, all Respiratory Care Practitioners     IV. Indications:   Implement this protocol for patients when physician orders oxygen to be administered or when patient is found to have an oxygen saturation of 88% or less. To assure routine monitoring of patient's oxygen saturation b.i.d. and to make appropriate adjustments in accordance with ordered oxygen saturation parameters. To assure continuity of respiratory care that meets [facility] Clinical Practice Guidelines and GOLD Guidelines. Hb < 8  Sickle Cell anemia crisis    V. Assessment:  Assess the following parameters to determine the need to adjust oxygen:  Measurement of patient's oxygen saturation via pulse oximetry. Observation of patient's color, respiratory effort, and responsiveness. Measurement of heart rate and respiratory rate. Complete a three-step ambulatory oxygen saturation when ordered. VI. Initiation:  Upon receipt of an order for oxygen, the RCP will:   Verify order in the patient's EMR, which should include the desired oxygen saturation to be maintained.   The patient shall be placed on

## 2023-05-31 NOTE — ANESTHESIA POSTPROCEDURE EVALUATION
Department of Anesthesiology  Postprocedure Note    Patient: Sabi Conner  MRN: [id number]  YOB: 1950  Date of evaluation: 5/31/2023      Procedure Summary     Date: 05/31/23 Room / Location: [facility] MAIN OR 08 / [facility] MAIN OR    Anesthesia Start: 6040 Anesthesia Stop: 3808    Procedure: lt KNEE TOTAL ARTHROPLASTY ROBOTIC/ SDD (Left: Knee) Diagnosis:       Primary osteoarthritis of left knee      (Primary osteoarthritis of left knee [M17.12])    Surgeons: Saji Kapadia MD Responsible Provider: Adan Saenz MD    Anesthesia Type: spinal ASA Status: 2          Anesthesia Type: No value filed.     Tasha Phase I: Tasha Score: 8    Tasha Phase II:        Anesthesia Post Evaluation    Patient location during evaluation: bedside  Patient participation: complete - patient participated  Level of consciousness: awake and alert  Pain score: 1  Airway patency: patent  Nausea & Vomiting: no vomiting  Complications: no  Cardiovascular status: hemodynamically stable  Respiratory status: acceptable  Hydration status: euvolemic

## 2023-05-31 NOTE — INTERVAL H&P NOTE
Update History & Physical    The patient's History and Physical of May 23, H&P was reviewed with the patient and I examined the patient. There was no change. The surgical site was confirmed by the patient and me. Plan: The risks, benefits, expected outcome, and alternative to the recommended procedure have been discussed with the patient. Patient understands and wants to proceed with the procedure.      Electronically signed by Natasha Underwood MD on 5/31/2023 at 6:38 AM

## 2023-05-31 NOTE — ANESTHESIA PROCEDURE NOTES
Spinal Block    Patient location during procedure: OR  End time: 5/31/2023 7:07 AM  Reason for block: primary anesthetic  Staffing  Performed: anesthesiologist   Anesthesiologist: Carina Dawkins MD  Spinal Block  Patient position: sitting  Prep: ChloraPrep  Patient monitoring: cardiac monitor, continuous pulse ox, frequent blood pressure checks and oxygen  Approach: midline  Location: L4/L5  Provider prep: mask and sterile gloves  Local infiltration: lidocaine  Needle  Needle type: Pencan   Needle gauge: 25 G  Assessment  Swirl obtained: Yes  CSF: clear  Attempts: 1  Hemodynamics: stable  Additional Notes  spinocan  Preanesthetic Checklist  Completed: patient identified, IV checked, risks and benefits discussed, surgical/procedural consents, equipment checked, pre-op evaluation, timeout performed, anesthesia consent given, oxygen available and monitors applied/VS acknowledged

## 2023-05-31 NOTE — PERIOP NOTE
TRANSFER - OUT REPORT:    Verbal report given to [facility] AT LEVI GUZMAN RN on [address]  being transferred to Room 311 for routine progression of patient care       Report consisted of patient's Situation, Background, Assessment and   Recommendations(SBAR). Information from the following report(s) Nurse Handoff Report, Surgery Report, Intake/Output, MAR, Recent Results, and Cardiac Rhythm SR  was reviewed with the receiving nurse. [place] Assessment: No data recorded  Lines:   Peripheral IV 05/31/23 Left;Posterior Forearm (Active)   Site Assessment Clean, dry & intact 05/31/23 0920   Line Status Infusing 05/31/23 0920   Line Care Connections checked and tightened 05/31/23 0920   Phlebitis Assessment No symptoms 05/31/23 0920   Infiltration Assessment 0 05/31/23 0920   Alcohol Cap Used No 05/31/23 0920   Dressing Status Clean, dry & intact 05/31/23 0920   Dressing Type Transparent 05/31/23 0920        Opportunity for questions and clarification was provided.       Patient transported with:  O2 @ 3lpm

## 2023-06-01 ENCOUNTER — TELEPHONE (OUTPATIENT)
Dept: ORTHOPEDICS UNIT | Age: 73
End: 2023-06-01

## 2023-06-01 ENCOUNTER — HOME CARE VISIT (OUTPATIENT)
Dept: SCHEDULING | Facility: HOME HEALTH | Age: 73
End: 2023-06-01
Payer: MEDICARE

## 2023-06-01 VITALS
DIASTOLIC BLOOD PRESSURE: 80 MMHG | RESPIRATION RATE: 18 BRPM | TEMPERATURE: 98.2 F | OXYGEN SATURATION: 93 % | SYSTOLIC BLOOD PRESSURE: 138 MMHG | HEART RATE: 72 BPM

## 2023-06-01 PROCEDURE — G0151 HHCP-SERV OF PT,EA 15 MIN: HCPCS

## 2023-06-01 ASSESSMENT — ENCOUNTER SYMPTOMS
PAIN LOCATION - PAIN QUALITY: STRONG ACHE
DYSPNEA ACTIVITY LEVEL: AFTER AMBULATING MORE THAN 20 FT

## 2023-06-01 NOTE — TELEPHONE ENCOUNTER
Called to follow up after TKA with SDD on 5/31/23. Doing \" pretty good. \"  Post op pain is managed. [facility] SOC is scheduled for today. Reviewed importance of ambulating at least every 45 minutes to an hour during the day. Advised regarding expected increase in post op pain and swelling around POD # 3. Reviewed cool jet ice machine protocol. Reviewed miralax protocol. Advised to keep surgical leg straight. Reviewed dose of ES tylenol. Reviewed contact number for ortho navigator.

## 2023-06-05 ENCOUNTER — HOME CARE VISIT (OUTPATIENT)
Dept: SCHEDULING | Facility: HOME HEALTH | Age: 73
End: 2023-06-05
Payer: MEDICARE

## 2023-06-05 ENCOUNTER — TELEPHONE (OUTPATIENT)
Dept: ORTHOPEDICS UNIT | Age: 73
End: 2023-06-05

## 2023-06-05 VITALS
DIASTOLIC BLOOD PRESSURE: 70 MMHG | OXYGEN SATURATION: 98 % | TEMPERATURE: 98.2 F | SYSTOLIC BLOOD PRESSURE: 138 MMHG | RESPIRATION RATE: 18 BRPM | HEART RATE: 88 BPM

## 2023-06-05 DIAGNOSIS — Z96.652 HX OF TOTAL KNEE ARTHROPLASTY, LEFT: Primary | ICD-10-CM

## 2023-06-05 PROCEDURE — G0151 HHCP-SERV OF PT,EA 15 MIN: HCPCS

## 2023-06-05 RX ORDER — OXYCODONE HYDROCHLORIDE 5 MG/1
5 TABLET ORAL EVERY 4 HOURS PRN
Qty: 30 TABLET | Refills: 0 | Status: SHIPPED | OUTPATIENT
Start: 2023-06-05 | End: 2023-06-10

## 2023-06-05 ASSESSMENT — ENCOUNTER SYMPTOMS: PAIN LOCATION - PAIN QUALITY: SORE

## 2023-06-05 NOTE — TELEPHONE ENCOUNTER
He is requesting a refill on Oxycodone to the [facility] on [address], [place].

## 2023-06-05 NOTE — TELEPHONE ENCOUNTER
Patient called navigator to report tooth pain that started over the weekend. Feels may be a cavity. Is 5 days s/p TKA. Not an emergency. Will continue to monitor. Will contact navigator if tooth pain worsens.

## 2023-06-07 ENCOUNTER — HOME CARE VISIT (OUTPATIENT)
Dept: SCHEDULING | Facility: HOME HEALTH | Age: 73
End: 2023-06-07
Payer: MEDICARE

## 2023-06-07 VITALS
DIASTOLIC BLOOD PRESSURE: 72 MMHG | HEART RATE: 72 BPM | SYSTOLIC BLOOD PRESSURE: 124 MMHG | TEMPERATURE: 98.2 F | RESPIRATION RATE: 18 BRPM | OXYGEN SATURATION: 98 %

## 2023-06-07 PROCEDURE — G0151 HHCP-SERV OF PT,EA 15 MIN: HCPCS

## 2023-06-07 ASSESSMENT — ENCOUNTER SYMPTOMS: PAIN LOCATION - PAIN QUALITY: SORE

## 2023-06-09 ENCOUNTER — HOME CARE VISIT (OUTPATIENT)
Dept: SCHEDULING | Facility: HOME HEALTH | Age: 73
End: 2023-06-09
Payer: MEDICARE

## 2023-06-09 VITALS
OXYGEN SATURATION: 98 % | HEART RATE: 72 BPM | RESPIRATION RATE: 18 BRPM | TEMPERATURE: 98.2 F | SYSTOLIC BLOOD PRESSURE: 128 MMHG | DIASTOLIC BLOOD PRESSURE: 74 MMHG

## 2023-06-09 PROCEDURE — G0151 HHCP-SERV OF PT,EA 15 MIN: HCPCS

## 2023-06-09 ASSESSMENT — ENCOUNTER SYMPTOMS: PAIN LOCATION - PAIN QUALITY: SORE

## 2023-06-10 DIAGNOSIS — Z96.652 S/P TOTAL KNEE ARTHROPLASTY, LEFT: Primary | ICD-10-CM

## 2023-06-19 ENCOUNTER — HOME CARE VISIT (OUTPATIENT)
Dept: SCHEDULING | Facility: HOME HEALTH | Age: 73
End: 2023-06-19
Payer: MEDICARE

## 2023-06-19 VITALS
OXYGEN SATURATION: 98 % | SYSTOLIC BLOOD PRESSURE: 126 MMHG | TEMPERATURE: 98.2 F | RESPIRATION RATE: 18 BRPM | HEART RATE: 74 BPM | DIASTOLIC BLOOD PRESSURE: 78 MMHG

## 2023-06-19 PROCEDURE — G0151 HHCP-SERV OF PT,EA 15 MIN: HCPCS

## 2023-06-19 ASSESSMENT — ENCOUNTER SYMPTOMS: PAIN LOCATION - PAIN QUALITY: SORE

## 2023-06-20 NOTE — PROGRESS NOTES
[facility]  [address]  [address] [facility] [address] [facility] [address]  Dept: [phone]      Physical Therapy Initial Assessment     Insurance: No Auth required [facility])      Total Timed Codes: 25 min, Total Treatment Time: 60 min  Modifier needed: No    Referring MD: Amanda Mcgarry MD  Surgery: Left TKA  Surgery Date: 5/31/2023    Diagnosis:     ICD-10-CM    1. Chronic pain of left knee  M25.562     G89.29       2. Joint stiffness of left lower leg  M25.662       3. Left leg swelling  M79.89       4. Difficulty walking  R26.2       5. Impaired mobility and ADLs  Z74.09     Z78.9       6. Muscle weakness  M62.81            Therapy precautions:None  Co-morbidities affecting plan of care: prostate CA, GERD, GOUT, HTN, chronic low back pain. PERTINENT MEDICAL HISTORY     Past medical and surgical history:   Past Medical History:   Diagnosis Date    Arthritis     OA    Cancer ([facility] [address] 2003    prostate - seed implants    GERD (gastroesophageal reflux disease)     controlled with meds    Gout     Hypertension     controlled with meds      Past Surgical History:   Procedure Laterality Date    KNEE ARTHROSCOPY Bilateral     TONSILLECTOMY  age 10    TOTAL KNEE ARTHROPLASTY Left 5/31/2023    lt KNEE TOTAL ARTHROPLASTY ROBOTIC/ SDD performed by Maranda Valencia MD at [facility]     Medications: reviewed in chart   Allergies: No Known Allergies         SUBJECTIVE     Chief complaints/history of injury: Patient presents to therapy s/p left TKA due to chronic left knee pain. He also has chronic low back pain. Patient c/o mild pain left knee. He is taking Tylenol for pain and 1 pain pill at night to sleep. He is using cane for ambulation. HE c/o limited motion left knee. He c/o swelling left knee. He states home therapy ended Monday. Received previous outpatient therapy?  No only home therapy    Pain Assessment:  Pain location: left knee    Average Pain/symptom intensity (0-10

## 2023-06-21 ENCOUNTER — EVALUATION (OUTPATIENT)
Age: 73
End: 2023-06-21

## 2023-06-21 DIAGNOSIS — M25.662 JOINT STIFFNESS OF LEFT LOWER LEG: ICD-10-CM

## 2023-06-21 DIAGNOSIS — M79.89 LEFT LEG SWELLING: ICD-10-CM

## 2023-06-21 DIAGNOSIS — G89.29 CHRONIC PAIN OF LEFT KNEE: Primary | ICD-10-CM

## 2023-06-21 DIAGNOSIS — M25.562 CHRONIC PAIN OF LEFT KNEE: Primary | ICD-10-CM

## 2023-06-21 DIAGNOSIS — Z74.09 IMPAIRED MOBILITY AND ADLS: ICD-10-CM

## 2023-06-21 DIAGNOSIS — Z96.652 S/P TOTAL KNEE ARTHROPLASTY, LEFT: ICD-10-CM

## 2023-06-21 DIAGNOSIS — M62.81 MUSCLE WEAKNESS: ICD-10-CM

## 2023-06-21 DIAGNOSIS — R26.2 DIFFICULTY WALKING: ICD-10-CM

## 2023-06-21 DIAGNOSIS — Z78.9 IMPAIRED MOBILITY AND ADLS: ICD-10-CM

## 2023-06-26 ENCOUNTER — TREATMENT (OUTPATIENT)
Age: 73
End: 2023-06-26
Payer: MEDICARE

## 2023-06-26 DIAGNOSIS — M25.662 JOINT STIFFNESS OF LEFT LOWER LEG: ICD-10-CM

## 2023-06-26 DIAGNOSIS — Z74.09 IMPAIRED MOBILITY AND ADLS: ICD-10-CM

## 2023-06-26 DIAGNOSIS — Z78.9 IMPAIRED MOBILITY AND ADLS: ICD-10-CM

## 2023-06-26 DIAGNOSIS — M62.81 MUSCLE WEAKNESS: ICD-10-CM

## 2023-06-26 DIAGNOSIS — M79.89 LEFT LEG SWELLING: ICD-10-CM

## 2023-06-26 DIAGNOSIS — M25.562 CHRONIC PAIN OF LEFT KNEE: Primary | ICD-10-CM

## 2023-06-26 DIAGNOSIS — G89.29 CHRONIC PAIN OF LEFT KNEE: Primary | ICD-10-CM

## 2023-06-26 DIAGNOSIS — R26.2 DIFFICULTY WALKING: ICD-10-CM

## 2023-06-26 PROCEDURE — 97110 THERAPEUTIC EXERCISES: CPT | Performed by: PHYSICAL THERAPIST

## 2023-06-28 ENCOUNTER — TELEPHONE (OUTPATIENT)
Age: 73
End: 2023-06-28

## 2023-06-29 ENCOUNTER — TREATMENT (OUTPATIENT)
Age: 73
End: 2023-06-29

## 2023-06-29 DIAGNOSIS — M79.89 LEFT LEG SWELLING: ICD-10-CM

## 2023-06-29 DIAGNOSIS — Z78.9 IMPAIRED MOBILITY AND ADLS: ICD-10-CM

## 2023-06-29 DIAGNOSIS — M25.562 CHRONIC PAIN OF LEFT KNEE: Primary | ICD-10-CM

## 2023-06-29 DIAGNOSIS — M62.81 MUSCLE WEAKNESS: ICD-10-CM

## 2023-06-29 DIAGNOSIS — G89.29 CHRONIC PAIN OF LEFT KNEE: Primary | ICD-10-CM

## 2023-06-29 DIAGNOSIS — M25.662 JOINT STIFFNESS OF LEFT LOWER LEG: ICD-10-CM

## 2023-06-29 DIAGNOSIS — R26.2 DIFFICULTY WALKING: ICD-10-CM

## 2023-06-29 DIAGNOSIS — Z74.09 IMPAIRED MOBILITY AND ADLS: ICD-10-CM

## 2023-06-30 RX ORDER — CELECOXIB 200 MG/1
CAPSULE ORAL
Qty: 60 CAPSULE | Refills: 0 | OUTPATIENT
Start: 2023-06-30

## 2023-06-30 RX ORDER — SALICYLIC ACID 40 %
ADHESIVE PATCH, MEDICATED TOPICAL
Qty: 70 TABLET | Refills: 0 | OUTPATIENT
Start: 2023-06-30

## 2023-07-03 ENCOUNTER — TREATMENT (OUTPATIENT)
Age: 73
End: 2023-07-03
Payer: MEDICARE

## 2023-07-03 DIAGNOSIS — M25.562 CHRONIC PAIN OF LEFT KNEE: Primary | ICD-10-CM

## 2023-07-03 DIAGNOSIS — Z74.09 IMPAIRED MOBILITY AND ADLS: ICD-10-CM

## 2023-07-03 DIAGNOSIS — R26.2 DIFFICULTY WALKING: ICD-10-CM

## 2023-07-03 DIAGNOSIS — Z78.9 IMPAIRED MOBILITY AND ADLS: ICD-10-CM

## 2023-07-03 DIAGNOSIS — M79.89 LEFT LEG SWELLING: ICD-10-CM

## 2023-07-03 DIAGNOSIS — G89.29 CHRONIC PAIN OF LEFT KNEE: Primary | ICD-10-CM

## 2023-07-03 DIAGNOSIS — M25.662 JOINT STIFFNESS OF LEFT LOWER LEG: ICD-10-CM

## 2023-07-03 DIAGNOSIS — M62.81 MUSCLE WEAKNESS: ICD-10-CM

## 2023-07-03 PROCEDURE — 97140 MANUAL THERAPY 1/> REGIONS: CPT | Performed by: PHYSICAL THERAPIST

## 2023-07-03 PROCEDURE — 97110 THERAPEUTIC EXERCISES: CPT | Performed by: PHYSICAL THERAPIST

## 2023-07-05 ENCOUNTER — OFFICE VISIT (OUTPATIENT)
Dept: ORTHOPEDIC SURGERY | Age: 73
End: 2023-07-05

## 2023-07-05 DIAGNOSIS — Z96.652 HX OF TOTAL KNEE ARTHROPLASTY, LEFT: Primary | ICD-10-CM

## 2023-07-05 PROCEDURE — 99024 POSTOP FOLLOW-UP VISIT: CPT | Performed by: PHYSICIAN ASSISTANT

## 2023-07-05 NOTE — PROGRESS NOTES
today. He has good (-) balance with tandem standing on even surface. He is unable to drive golf ball or imitate at drive due to hesitance to weight shift left. He is able to reciprocate stairs at home ascending but has moderate difficulty descending. He is unable to play golf. PLAN     Continue with stretching and strengthening exercises to improve function and gait. Progress exercises as tolerated. Use modalities as needed to reduce edema and painful symptoms. Use manual therapy as needed to reduce edema and painful symptoms and to improve AROM left knee. PLAN OF CARE      Effective Dates: 6/21/2023 TO 8/20/2023 (60 days). Frequency/Duration: 2x/week for 60 Day(s)    GOALS     Short term goals to be met by 7/19/2023  (4 weeks): Independent with HEP  Increase AROM L knee flexion to 120 degrees -MET  Increased AROM L knee extension to 0 (full extension) -MET  Able to move sit to stand with minimal deviation from 20\" seat-MET  Able to ambulate with normal gait using heel strike and toe off L LE-MET  Able to step up on 6\" step with L left with minimal deviation-MET  Able to reciprocate stairs in clinic with minimal deviation-MET  Able to chip and putt in golf  Reduce painful symptoms to less than 1/10 at worst      Long term goals to be met by 8/16/2023  (8 weeks):    Increase AROM L knee flexion to 125 degrees   Increase AROM L knee extension to pain-free full extension  MMT > +4/5  all planes L LE    Able to able to ambulate normally on all surfaces  Able to do all household chores  Patient will report 0/10 pain L knee  Able to reciprocate stairs in community without deviation  Able to tie left shoulder without difficulty  Resume modified golfing  Able to ambulate safely for community distances without assistive device on all surfaces  Improve LEFS to >/= 30% functional deficit   Resume normal activities/ADLs as discussed in evaluation   Discharged from [facility]  Access Code: [email or web address]

## 2023-07-05 NOTE — PROGRESS NOTES
Post-op TKA Visit      07/05/23     No Known Allergies  Current Outpatient Medications on File Prior to Visit   Medication Sig Dispense Refill    acetaminophen (TYLENOL) 500 MG tablet Take 1,000 mg by mouth every 6 hours as needed for Pain (breakthrough). aspirin EC 81 MG EC tablet Take 1 tablet by mouth in the morning and 1 tablet in the evening. 70 tablet 0    celecoxib (CELEBREX) 200 MG capsule Take 1 capsule by mouth 2 times daily 60 capsule 0    promethazine (PHENERGAN) 12.5 MG tablet Take 1 tablet by mouth 4 times daily as needed for Nausea 20 tablet 2    carvedilol (COREG) 6.25 MG tablet Take 6.25 mg by mouth 2 times daily      colchicine (COLCRYS) 0.6 MG tablet Take 0.6 mg by mouth daily      esomeprazole (NEXIUM) 20 MG delayed release capsule Take 1 capsule by mouth daily      allopurinol (ZYLOPRIM) 100 MG tablet Take 1 tablet by mouth 2 times daily      amLODIPine (NORVASC) 10 MG tablet Take 1 tablet by mouth daily      benazepril (LOTENSIN) 40 MG tablet Take 1 tablet by mouth daily      rosuvastatin (CRESTOR) 5 MG tablet Take 1 tablet by mouth at bedtime       No current facility-administered medications on file prior to visit. 5 weeks Status Post left TKA     History: The patient returns today for post-op visit following left TKA. Their pain is improving. They are ambulating without any walking aid. They have completed home physical therapy and started outpatient therapy which is going well. They are pleased with their results thus far. Denies any new complaints today. Physical Exam:  The patient's incision is well healed. There is mild swelling and minimal increased warmth. ROM is 0 to 125 degrees. There is no M/L or A/P instability. The calf is soft and non-tender. Neurologic and vascular exams are intact. X-Rays: AP and Lateral views of the left knee reveals a cementless TKA, [place] prosthesis. There is no patella arthroplasty.  There is good alignment and good position of the

## 2023-07-06 ENCOUNTER — TREATMENT (OUTPATIENT)
Age: 73
End: 2023-07-06

## 2023-07-06 DIAGNOSIS — G89.29 CHRONIC PAIN OF LEFT KNEE: Primary | ICD-10-CM

## 2023-07-06 DIAGNOSIS — R26.2 DIFFICULTY WALKING: ICD-10-CM

## 2023-07-06 DIAGNOSIS — M79.89 LEFT LEG SWELLING: ICD-10-CM

## 2023-07-06 DIAGNOSIS — M62.81 MUSCLE WEAKNESS: ICD-10-CM

## 2023-07-06 DIAGNOSIS — M25.662 JOINT STIFFNESS OF LEFT LOWER LEG: ICD-10-CM

## 2023-07-06 DIAGNOSIS — Z78.9 IMPAIRED MOBILITY AND ADLS: ICD-10-CM

## 2023-07-06 DIAGNOSIS — Z74.09 IMPAIRED MOBILITY AND ADLS: ICD-10-CM

## 2023-07-06 DIAGNOSIS — M25.562 CHRONIC PAIN OF LEFT KNEE: Primary | ICD-10-CM

## 2023-07-10 ENCOUNTER — TREATMENT (OUTPATIENT)
Age: 73
End: 2023-07-10
Payer: MEDICARE

## 2023-07-10 DIAGNOSIS — M79.89 LEFT LEG SWELLING: ICD-10-CM

## 2023-07-10 DIAGNOSIS — R26.2 DIFFICULTY WALKING: ICD-10-CM

## 2023-07-10 DIAGNOSIS — Z74.09 IMPAIRED MOBILITY AND ADLS: ICD-10-CM

## 2023-07-10 DIAGNOSIS — M25.562 CHRONIC PAIN OF LEFT KNEE: Primary | ICD-10-CM

## 2023-07-10 DIAGNOSIS — Z78.9 IMPAIRED MOBILITY AND ADLS: ICD-10-CM

## 2023-07-10 DIAGNOSIS — G89.29 CHRONIC PAIN OF LEFT KNEE: Primary | ICD-10-CM

## 2023-07-10 DIAGNOSIS — M25.662 JOINT STIFFNESS OF LEFT LOWER LEG: ICD-10-CM

## 2023-07-10 DIAGNOSIS — M62.81 MUSCLE WEAKNESS: ICD-10-CM

## 2023-07-10 PROCEDURE — 97110 THERAPEUTIC EXERCISES: CPT | Performed by: PHYSICAL THERAPIST

## 2023-07-12 NOTE — PROGRESS NOTES
[address] [address]  Dept: [phone]      Physical Therapy Daily Note     Insurance: No Auth required [facility])      Total Timed Codes: 40 min, Total Treatment Time: 40 min  Modifier needed: No    Referring MD: Almaz Golden MD  Surgery: Left TKA  Surgery Date: 5/31/2023    Diagnosis:     ICD-10-CM    1. Chronic pain of left knee  M25.562     G89.29       2. Joint stiffness of left lower leg  M25.662       3. Left leg swelling  M79.89       4. Difficulty walking  R26.2       5. Impaired mobility and ADLs  Z74.09     Z78.9       6. Muscle weakness  M62.81            Therapy precautions:None  Co-morbidities affecting plan of care: prostate CA, GERD, GOUT, HTN, chronic low back pain. SUBJECTIVE     Patient reports left knee feels good. He has resumed most ADLS and normal activities. He has not resumed golfing. He is able to don shoes and socks now without difficulty. He states left knee is not painful with sleep and he sleeps normally. OBJECTIVE     Treatment provided today:  Therapeutic exercise (53893) x 40 min to develop ROM, strength, endurance and flexibility. Included:  Bike L3 for 5 minutes (seat set with 3 holes showing)    Leg press 110# 30x  Single LP 70# 30x  HS 55#  Knee extension 30# (90-20 degrees)    Quad sets 20x    Sit to stand from 22\" seat with feet on foam 30x    Seated HS stretch 1 minute  Standing calf stretch 1 minute  Reciprocate stairs 10x    Step over on 360 30x  Heel raises on 360 20x  Marching on 360 20x  Squats at barre 20x      A/PROM Measures: 7/10/2023     Right Left Comment   Knee Flexion 140A 126A    Knee Extension 0 0                     Patient Education on the condition/pathology, involved anatomy, and exercise rationale. (Scar massage)    ASSESSMENT     Patient has no pain left knee today. He has mild edema left knee. He has full left knee extension.  He does not require SBA with balance

## 2023-07-13 ENCOUNTER — TREATMENT (OUTPATIENT)
Age: 73
End: 2023-07-13

## 2023-07-13 DIAGNOSIS — R26.2 DIFFICULTY WALKING: ICD-10-CM

## 2023-07-13 DIAGNOSIS — M25.562 CHRONIC PAIN OF LEFT KNEE: Primary | ICD-10-CM

## 2023-07-13 DIAGNOSIS — M62.81 MUSCLE WEAKNESS: ICD-10-CM

## 2023-07-13 DIAGNOSIS — G89.29 CHRONIC PAIN OF LEFT KNEE: Primary | ICD-10-CM

## 2023-07-13 DIAGNOSIS — Z74.09 IMPAIRED MOBILITY AND ADLS: ICD-10-CM

## 2023-07-13 DIAGNOSIS — Z78.9 IMPAIRED MOBILITY AND ADLS: ICD-10-CM

## 2023-07-13 DIAGNOSIS — M79.89 LEFT LEG SWELLING: ICD-10-CM

## 2023-07-13 DIAGNOSIS — M25.662 JOINT STIFFNESS OF LEFT LOWER LEG: ICD-10-CM

## 2023-07-17 ENCOUNTER — TREATMENT (OUTPATIENT)
Age: 73
End: 2023-07-17
Payer: MEDICARE

## 2023-07-17 DIAGNOSIS — M79.89 LEFT LEG SWELLING: ICD-10-CM

## 2023-07-17 DIAGNOSIS — Z74.09 IMPAIRED MOBILITY AND ADLS: ICD-10-CM

## 2023-07-17 DIAGNOSIS — M62.81 MUSCLE WEAKNESS: ICD-10-CM

## 2023-07-17 DIAGNOSIS — M25.662 JOINT STIFFNESS OF LEFT LOWER LEG: ICD-10-CM

## 2023-07-17 DIAGNOSIS — G89.29 CHRONIC PAIN OF LEFT KNEE: Primary | ICD-10-CM

## 2023-07-17 DIAGNOSIS — M25.562 CHRONIC PAIN OF LEFT KNEE: Primary | ICD-10-CM

## 2023-07-17 DIAGNOSIS — R26.2 DIFFICULTY WALKING: ICD-10-CM

## 2023-07-17 DIAGNOSIS — Z78.9 IMPAIRED MOBILITY AND ADLS: ICD-10-CM

## 2023-07-17 PROCEDURE — 97110 THERAPEUTIC EXERCISES: CPT | Performed by: PHYSICAL THERAPIST

## 2023-07-19 NOTE — PROGRESS NOTES
[address] [address]  Dept: [phone]      Physical Therapy Daily Note     Insurance: No Auth required [facility])      Total Timed Codes: 40 min, Total Treatment Time: 40 min  Modifier needed: No    Referring MD: Elizabeth Wilkinson MD  Surgery: Left TKA  Surgery Date: 5/31/2023    Diagnosis:     ICD-10-CM    1. Chronic pain of left knee  M25.562     G89.29       2. Joint stiffness of left lower leg  M25.662       3. Left leg swelling  M79.89       4. Difficulty walking  R26.2       5. Impaired mobility and ADLs  Z74.09     Z78.9       6. Muscle weakness  M62.81            Therapy precautions:None  Co-morbidities affecting plan of care: prostate CA, GERD, GOUT, HTN, chronic low back pain. SUBJECTIVE     Patient reports left knee feels good. He reports 1/10 pain today. He states he has 4/10 when he first wakes up. He states he is going to the gym next week to work on strengthening independently. He will report back the following week to see if he is ready for DC to work out at gym. OBJECTIVE     Treatment provided today:  Therapeutic exercise (34692) x 40 min to develop ROM, strength, endurance and flexibility. Included:  Bike L3 for 5 minutes (seat set with 3 holes showing)    Leg press 110# 30x  Single LP 70# 30x  HS 55#  Knee extension 30# (90-20 degrees)    Sit to stand from 22\" seat with feet on foam 30x    Seated HS stretch 1 minute      Step over 360 20x  Heel raises on 360 20x  Marching on 360 20x  Squats on 360 20x      ASSESSMENT     Patient has very mild pain left knee today. He has mild edema left knee. He has mild pain 25% or less of the time. He has full left knee extension. He is independent with squat on 360 balance activity. Balance on 360 with heel raises and marching in good (-). He is able to drive golf ball to play golf. He is able to walk around [place] without difficulty.  He has joined gym and will try

## 2023-07-20 ENCOUNTER — TREATMENT (OUTPATIENT)
Age: 73
End: 2023-07-20

## 2023-07-20 DIAGNOSIS — M79.89 LEFT LEG SWELLING: ICD-10-CM

## 2023-07-20 DIAGNOSIS — R26.2 DIFFICULTY WALKING: ICD-10-CM

## 2023-07-20 DIAGNOSIS — M25.662 JOINT STIFFNESS OF LEFT LOWER LEG: ICD-10-CM

## 2023-07-20 DIAGNOSIS — G89.29 CHRONIC PAIN OF LEFT KNEE: Primary | ICD-10-CM

## 2023-07-20 DIAGNOSIS — M62.81 MUSCLE WEAKNESS: ICD-10-CM

## 2023-07-20 DIAGNOSIS — Z74.09 IMPAIRED MOBILITY AND ADLS: ICD-10-CM

## 2023-07-20 DIAGNOSIS — M25.562 CHRONIC PAIN OF LEFT KNEE: Primary | ICD-10-CM

## 2023-07-20 DIAGNOSIS — Z78.9 IMPAIRED MOBILITY AND ADLS: ICD-10-CM

## 2023-07-31 ENCOUNTER — TREATMENT (OUTPATIENT)
Age: 73
End: 2023-07-31
Payer: MEDICARE

## 2023-07-31 DIAGNOSIS — R26.2 DIFFICULTY WALKING: ICD-10-CM

## 2023-07-31 DIAGNOSIS — Z74.09 IMPAIRED MOBILITY AND ADLS: ICD-10-CM

## 2023-07-31 DIAGNOSIS — M25.662 JOINT STIFFNESS OF LEFT LOWER LEG: ICD-10-CM

## 2023-07-31 DIAGNOSIS — Z78.9 IMPAIRED MOBILITY AND ADLS: ICD-10-CM

## 2023-07-31 DIAGNOSIS — M62.81 MUSCLE WEAKNESS: ICD-10-CM

## 2023-07-31 DIAGNOSIS — G89.29 CHRONIC PAIN OF LEFT KNEE: Primary | ICD-10-CM

## 2023-07-31 DIAGNOSIS — M79.89 LEFT LEG SWELLING: ICD-10-CM

## 2023-07-31 DIAGNOSIS — M25.562 CHRONIC PAIN OF LEFT KNEE: Primary | ICD-10-CM

## 2023-07-31 PROCEDURE — 97110 THERAPEUTIC EXERCISES: CPT | Performed by: PHYSICAL THERAPIST

## 2023-07-31 NOTE — PROGRESS NOTES
[address] [address]  Dept: [phone]      Physical Therapy Discharge     Insurance: No Auth required [facility])      Total Timed Codes: 35 min, Total Treatment Time: 35 min  Modifier needed: No    Referring MD: Lawyer Aure MD  Surgery: Left TKA  Surgery Date: 5/31/2023    Diagnosis:     ICD-10-CM    1. Chronic pain of left knee  M25.562     G89.29       2. Joint stiffness of left lower leg  M25.662       3. Left leg swelling  M79.89       4. Difficulty walking  R26.2       5. Impaired mobility and ADLs  Z74.09     Z78.9       6. Muscle weakness  M62.81            Therapy precautions:None  Co-morbidities affecting plan of care: prostate CA, GERD, GOUT, HTN, chronic low back pain. SUBJECTIVE     Patient reports left knee feels good. He reports 0/10 pain today. He states he has 2/10 every once in a while. He has joined the gym and will continue with exercises. He has resumed playing golf. OBJECTIVE     Treatment provided today:  Therapeutic exercise (85135) x 35 min to develop ROM, strength, endurance and flexibility.   Included:  Bike L3 for 5 minutes (seat set with 3 holes showing)    Leg press 110# 30x  Single LP 70# 30x  HS 55#  Knee extension 30# (90-20 degrees)    Sit to stand from 22\" seat with feet on foam 30x    Seated HS stretch 1 minute      Step over 360 20x  Heel raises on 360 20x  Marching on 360 20x  Squats on 360 20x      A/PROM Measures: 7/10/2023       Right Left Comment   Knee Flexion 140A 128A     Knee Extension 0 0                               Strength/MMT (0-5 Scale):       Right Left Comment   Knee Flexion   +4     Knee Extension   +4               Hip Flexion   +4     Hip Extension   +4     Hip Abduction   +4     Hip ER         Hip IR         Ankle DF         Ankle PF                          Special Tests/Function:   Gait: assistive device used: none  Normal gait  Stair management:reciprocal ascending

## 2023-08-18 NOTE — PROGRESS NOTES
Name: Papito Saleem  YOB: 1950  Gender: male  MRN: [id number]  Date of Encounter:  8/21/2023       CHIEF COMPLAINT:     Chief Complaint   Patient presents with    Injections     Left Shoulder (GHI)        SUBJECTIVE/OBJECTIVE:      HPI:    Patient is a 67 y.o. pleasant male who presents today for an injection of the left shoulder. Mr. Marlene Roy has had chronic atraumatic left shoulder pain. His first examination was suggestive of likely rotator cuff involvement although he had preserved cuff strength, and XR findings of GH arthritis. He had great relief of shoulder pain with first CSI to the left GHJ. It gave him nearly 3 months of pain relief. We repeated injection just prior to his MARILEE with Dr. Remberto Buck. He again got around 2-1/2 months of pain relief and then 1 day pain returned. He denies any new injury. We have previously discussed further options for management of his shoulder pain including MRI / possible surgical evaluation, but he wishes to continue injections at this time. PHYSICAL EXAMINATION:     Gen: Well-developed, no acute distress   HEENT: NC/AT, EOMI   Neck: Trachea midline, normal ROM   CV: Regular rhythm by palpation of distal pulse, normal capillary refill   Pulm: No respiratory distress, no stridor   Psychiatric: Well oriented, normal mood and affect. Skin: No rashes, lesions or ulcers, normal temperature, turgor, and texture on uninvolved extremity. Left shoulder exam:     No effusion, erythema. Forward flexion 170, abduction 160. External rotation 40. Internal rotation lumbar spine. Resisted abduction 4+/5. External rotation 5/5 internal rotation 5/5. Mild pain with Jobes, thacker, neers. Negative speeds. 2+ radial pulse, SILT    ASSESSMENT/PLAN:     Encounter Diagnoses   Name Primary?     Glenohumeral arthritis, left Yes    Tendinopathy of left rotator cuff         We again discussed consideration of further work up including MRI for possible surgical

## 2023-08-21 ENCOUNTER — OFFICE VISIT (OUTPATIENT)
Dept: ORTHOPEDIC SURGERY | Age: 73
End: 2023-08-21

## 2023-08-21 DIAGNOSIS — M19.012 GLENOHUMERAL ARTHRITIS, LEFT: Primary | ICD-10-CM

## 2023-08-21 DIAGNOSIS — M67.912 TENDINOPATHY OF LEFT ROTATOR CUFF: ICD-10-CM

## 2023-08-21 RX ORDER — METHYLPREDNISOLONE ACETATE 40 MG/ML
40 INJECTION, SUSPENSION INTRA-ARTICULAR; INTRALESIONAL; INTRAMUSCULAR; SOFT TISSUE ONCE
Status: COMPLETED | OUTPATIENT
Start: 2023-08-21 | End: 2023-08-21

## 2023-08-21 RX ADMIN — METHYLPREDNISOLONE ACETATE 40 MG: 40 INJECTION, SUSPENSION INTRA-ARTICULAR; INTRALESIONAL; INTRAMUSCULAR; SOFT TISSUE at 09:02

## 2023-11-20 ENCOUNTER — OFFICE VISIT (OUTPATIENT)
Dept: ORTHOPEDIC SURGERY | Age: 73
End: 2023-11-20
Payer: MEDICARE

## 2023-11-20 DIAGNOSIS — M19.012 GLENOHUMERAL ARTHRITIS, LEFT: Primary | ICD-10-CM

## 2023-11-20 PROCEDURE — 20611 DRAIN/INJ JOINT/BURSA W/US: CPT | Performed by: STUDENT IN AN ORGANIZED HEALTH CARE EDUCATION/TRAINING PROGRAM

## 2023-11-20 RX ORDER — METHYLPREDNISOLONE ACETATE 40 MG/ML
40 INJECTION, SUSPENSION INTRA-ARTICULAR; INTRALESIONAL; INTRAMUSCULAR; SOFT TISSUE ONCE
Status: COMPLETED | OUTPATIENT
Start: 2023-11-20 | End: 2023-11-20

## 2023-11-20 RX ADMIN — METHYLPREDNISOLONE ACETATE 40 MG: 40 INJECTION, SUSPENSION INTRA-ARTICULAR; INTRALESIONAL; INTRAMUSCULAR; SOFT TISSUE at 09:15

## 2023-12-06 ENCOUNTER — OFFICE VISIT (OUTPATIENT)
Dept: ORTHOPEDIC SURGERY | Age: 73
End: 2023-12-06
Payer: MEDICARE

## 2023-12-06 DIAGNOSIS — Z96.652 STATUS POST TOTAL LEFT KNEE REPLACEMENT: Primary | ICD-10-CM

## 2023-12-06 PROCEDURE — 1123F ACP DISCUSS/DSCN MKR DOCD: CPT | Performed by: PHYSICIAN ASSISTANT

## 2023-12-06 PROCEDURE — 99214 OFFICE O/P EST MOD 30 MIN: CPT | Performed by: PHYSICIAN ASSISTANT

## 2023-12-06 RX ORDER — AMOXICILLIN 500 MG/1
CAPSULE ORAL
Qty: 12 CAPSULE | Refills: 0 | Status: SHIPPED | OUTPATIENT
Start: 2023-12-06

## 2023-12-06 NOTE — PROGRESS NOTES
Post-op TKA Visit      12/06/23     No Known Allergies  Current Outpatient Medications on File Prior to Visit   Medication Sig Dispense Refill    acetaminophen (TYLENOL) 500 MG tablet Take 1,000 mg by mouth every 6 hours as needed for Pain (breakthrough). aspirin EC 81 MG EC tablet Take 1 tablet by mouth in the morning and 1 tablet in the evening. 70 tablet 0    celecoxib (CELEBREX) 200 MG capsule Take 1 capsule by mouth 2 times daily 60 capsule 0    promethazine (PHENERGAN) 12.5 MG tablet Take 1 tablet by mouth 4 times daily as needed for Nausea 20 tablet 2    carvedilol (COREG) 6.25 MG tablet Take 6.25 mg by mouth 2 times daily      colchicine (COLCRYS) 0.6 MG tablet Take 0.6 mg by mouth daily      esomeprazole (NEXIUM) 20 MG delayed release capsule Take 1 capsule by mouth daily      allopurinol (ZYLOPRIM) 100 MG tablet Take 1 tablet by mouth 2 times daily      amLODIPine (NORVASC) 10 MG tablet Take 1 tablet by mouth daily      benazepril (LOTENSIN) 40 MG tablet Take 1 tablet by mouth daily      rosuvastatin (CRESTOR) 5 MG tablet Take 1 tablet by mouth at bedtime       No current facility-administered medications on file prior to visit. 6 months Status Post left TKA     History: The patient returns today for post-op visit following left TKA. Their pain is improving. They are ambulating without any walking aid. They have completed physical therapy and resumed most of their normal activities. They are pleased with their results thus far. Denies any new complaints today. Physical Exam:  The patient's incision is well healed. There is minimal swelling and minimal increased warmth. ROM is 0 to 120 degrees. There is no M/L or A/P instability. The calf is soft and non-tender. Neurologic and vascular exams are intact. X-Rays: AP, Lateral, and sunrise views of the left knee reveals a cementless TKA, [place] prosthesis. There is patella arthroplasty.  There is good alignment and good position of the

## 2024-02-21 RX ORDER — METHYLPREDNISOLONE ACETATE 80 MG/ML
80 INJECTION, SUSPENSION INTRA-ARTICULAR; INTRALESIONAL; INTRAMUSCULAR; SOFT TISSUE ONCE
Status: CANCELLED | OUTPATIENT
Start: 2024-02-21 | End: 2024-02-21

## 2024-02-23 ENCOUNTER — OFFICE VISIT (OUTPATIENT)
Dept: ORTHOPEDIC SURGERY | Age: 74
End: 2024-02-23

## 2024-02-23 DIAGNOSIS — M67.912 TENDINOPATHY OF LEFT ROTATOR CUFF: ICD-10-CM

## 2024-02-23 DIAGNOSIS — M19.012 GLENOHUMERAL ARTHRITIS, LEFT: Primary | ICD-10-CM

## 2024-02-23 RX ORDER — METHYLPREDNISOLONE ACETATE 80 MG/ML
80 INJECTION, SUSPENSION INTRA-ARTICULAR; INTRALESIONAL; INTRAMUSCULAR; SOFT TISSUE ONCE
Status: COMPLETED | OUTPATIENT
Start: 2024-02-23 | End: 2024-02-23

## 2024-02-23 RX ADMIN — METHYLPREDNISOLONE ACETATE 80 MG: 80 INJECTION, SUSPENSION INTRA-ARTICULAR; INTRALESIONAL; INTRAMUSCULAR; SOFT TISSUE at 09:11

## 2024-02-23 NOTE — PROGRESS NOTES
Name: Tyrese Daigle  YOB: 1950  Gender: male  MRN: [id number]  Date of Encounter:  2/23/2024       CHIEF COMPLAINT:     Chief Complaint   Patient presents with    Injections     Left Shoulder        SUBJECTIVE/OBJECTIVE:      HPI:    Patient is a 73 y.o. pleasant male who presents today for an injection of the right shoulder.    Recall hx:   Mr. Daigle has had chronic atraumatic left shoulder pain. His first examination was consistent with rotator cuff tendinosis. he had preserved cuff strength, and XR findings of GH arthritis. He had great relief of shoulder pain with first CSI to the left GHJ. It gave him nearly 3 months of pain relief. We repeated injection just prior to his MARILEE with Dr. Alvarez.      Repeat injections have given him about 2.5 months of pain relief.  He denies any new injury.  We have previously discussed further options for management of his shoulder pain including MRI / possible surgical evaluation, but he wishes to continue injections at this time.     Right shoulder exam:   No change from previous. No erythema or edema. Mild tenderness to rotator cuff footprint.     ASSESSMENT/PLAN:     Encounter Diagnoses   Name Primary?    Glenohumeral arthritis, left Yes    Tendinopathy of left rotator cuff         We discussed right glenohumeral joint injection today, risks and benefits of injection including pain with injection, bleeding, damage to surrounding structures, infection, elevation in blood glucose, steroid flare. They wished to proceed with injection today and this was performed.      Orders Placed This Encounter    US ARTHR/ASP/INJ MAJOR JNT/BURSA LEFT     Standing Status:   Future     Standing Expiration Date:   2/23/2025    methylPREDNISolone acetate (DEPO-MEDROL) injection 80 mg        Return in about 3 months (around 5/23/2024) for injection only.     Jennifer Walton MD  [facility]      PROCEDURE NOTE:     Left Glenohumeral Joint Injection -

## 2024-05-07 ENCOUNTER — TELEPHONE (OUTPATIENT)
Dept: ORTHOPEDIC SURGERY | Age: 74
End: 2024-05-07

## 2024-05-07 NOTE — TELEPHONE ENCOUNTER
Called patient to reschedule him for another day since she is no longer working at that time of his previous appt.

## 2024-05-21 NOTE — PROGRESS NOTES
Name: Tyrese Daigle  YOB: 1950  Gender: male  MRN: [id number]  Date of Encounter:  5/22/2024       CHIEF COMPLAINT:     Chief Complaint   Patient presents with    Injections     Left Shoulder        SUBJECTIVE/OBJECTIVE:      HPI:    Patient is a 73 y.o. pleasant male who presents today for an injection of the left shoulder.      Recall hx: Mr. Daigle has had chronic atraumatic left shoulder pain. His first examination was consistent with rotator cuff tendinosis. he had preserved cuff strength, and XR findings of GH arthritis. He had great relief of shoulder pain with first CSI to the left GHJ. It gave him nearly 3 months of pain relief. We repeated injection just prior to his MARILEE with Dr. Alvarez.      Repeat injections have given him about 2.5 months of pain relief.  He denies any new injury.  We have previously discussed further options for management of his shoulder pain including MRI / possible surgical evaluation, but he wishes to continue injections at this time.     2/23/24: GHI performed.   5/22/24: requesting repeat injection. Pleased with 2.5 months of pain relief.     ASSESSMENT/PLAN:     Encounter Diagnosis   Name Primary?    Glenohumeral arthritis, left Yes        We discussed left glenohumeral joint injection today, risks and benefits of injection including pain with injection, bleeding, damage to surrounding structures, infection, elevation in blood glucose, steroid flare. They wished to proceed with injection today and this was performed.      Orders Placed This Encounter    US ARTHR/ASP/INJ MAJOR JNT/BURSA LEFT     Standing Status:   Future     Number of Occurrences:   1     Standing Expiration Date:   5/22/2025    methylPREDNISolone acetate (DEPO-MEDROL) injection 80 mg        Return in about 3 months (around 8/22/2024).     Jennifer Walton MD  [facility]      PROCEDURE NOTE:     Left Glenohumeral Joint Injection - Ultrasound Guided     An injection of

## 2024-05-22 ENCOUNTER — OFFICE VISIT (OUTPATIENT)
Dept: ORTHOPEDIC SURGERY | Age: 74
End: 2024-05-22
Payer: MEDICARE

## 2024-05-22 DIAGNOSIS — M19.012 GLENOHUMERAL ARTHRITIS, LEFT: Primary | ICD-10-CM

## 2024-05-22 PROCEDURE — 20611 DRAIN/INJ JOINT/BURSA W/US: CPT | Performed by: STUDENT IN AN ORGANIZED HEALTH CARE EDUCATION/TRAINING PROGRAM

## 2024-05-22 RX ORDER — METHYLPREDNISOLONE ACETATE 80 MG/ML
80 INJECTION, SUSPENSION INTRA-ARTICULAR; INTRALESIONAL; INTRAMUSCULAR; SOFT TISSUE ONCE
Status: COMPLETED | OUTPATIENT
Start: 2024-05-22 | End: 2024-05-22

## 2024-05-22 RX ADMIN — METHYLPREDNISOLONE ACETATE 80 MG: 80 INJECTION, SUSPENSION INTRA-ARTICULAR; INTRALESIONAL; INTRAMUSCULAR; SOFT TISSUE at 08:17

## 2024-09-03 ENCOUNTER — OFFICE VISIT (OUTPATIENT)
Dept: ORTHOPEDIC SURGERY | Age: 74
End: 2024-09-03
Payer: MEDICARE

## 2024-09-03 DIAGNOSIS — M19.012 GLENOHUMERAL ARTHRITIS, LEFT: Primary | ICD-10-CM

## 2024-09-03 PROCEDURE — 1123F ACP DISCUSS/DSCN MKR DOCD: CPT | Performed by: STUDENT IN AN ORGANIZED HEALTH CARE EDUCATION/TRAINING PROGRAM

## 2024-09-03 PROCEDURE — 99213 OFFICE O/P EST LOW 20 MIN: CPT | Performed by: STUDENT IN AN ORGANIZED HEALTH CARE EDUCATION/TRAINING PROGRAM

## 2024-09-03 RX ORDER — METHYLPREDNISOLONE ACETATE 80 MG/ML
80 INJECTION, SUSPENSION INTRA-ARTICULAR; INTRALESIONAL; INTRAMUSCULAR; SOFT TISSUE ONCE
Status: CANCELLED | OUTPATIENT
Start: 2024-09-03 | End: 2024-09-03

## 2024-09-03 NOTE — PROGRESS NOTES
Name: Tyrese Daigle  YOB: 1950  Gender: male  MRN: [id number]  Date of Encounter:  9/3/2024       CHIEF COMPLAINT:     Chief Complaint   Patient presents with    Follow-up     Left Shoulder        SUBJECTIVE/OBJECTIVE:      HPI:    Patient is a 73 y.o. pleasant male who presents today for a recheck of the left shoulder.    Recall hx: Mr. Daigle has had chronic atraumatic left shoulder pain. His first examination was consistent with rotator cuff tendinosis. he had preserved cuff strength, and XR findings of GH arthritis. He had great relief of shoulder pain with first CSI to the left GHJ. It gave him nearly 3 months of pain relief. We repeated injection just prior to his MARILEE with Dr. Alvarez.      Repeat injections have given him about 2.5 months of pain relief.  He denies any new injury.  We have previously discussed further options for management of his shoulder pain including MRI / possible surgical evaluation, but he wishes to continue injections at this time.      2/23/24: GHI performed.   5/22/24: requesting repeat injection. Pleased with 2.5 months of pain relief.   9/3/24: His shoulder is doing fairly well. He has full range. He denies daily pain. He does take Aleve and Tylenol on a daily basis for general aches and pains.  He has questions about whether he should proceed with a repeat injection today.    Past medical, surgical, social history reviewed.    PHYSICAL EXAMINATION:     Gen: Well-developed, no acute distress   HEENT: NC/AT, EOMI   Neck: Trachea midline, normal ROM   CV: Regular rhythm by palpation of distal pulse, normal capillary refill   Pulm: No respiratory distress, no stridor   Psychiatric: Well oriented, normal mood and affect.   Skin: No rashes, lesions or ulcers, normal temperature, turgor, and texture on uninvolved extremity.      Left shoulder exam:     No deformity.  Full forward flexion, abduction, external rotation to 80, internal rotation to 55.  There is no

## 2024-11-06 NOTE — PROGRESS NOTES
ACUTE PHYSICAL THERAPY GOALS:   (Developed with and agreed upon by patient and/or caregiver.)  GOALS (1-4 days):  (1.)Mr. Magda Reddy will move from supine to sit and sit to supine  in bed with INDEPENDENT. (2.)Mr. Magda Reddy will transfer from bed to chair and chair to bed with SUPERVISION using the least restrictive device. (3.)Mr. Daigle will ambulate with SUPERVISION for 400 feet with the least restrictive device. (4.)Mr. Daigle will ambulate up/down 3 steps with left railing with STAND BY ASSIST. (For practice, pt has no stirs)  (5.)Mr. Daigle will increase left knee ROM to 0-90°.  Met 5/31  ________________________________________________________________________________________________           PHYSICAL THERAPY JOINT CAMP: TOTAL KNEE ARTHROPLASTY Initial Assessment and PM  (Link to Caseload Tracking: PT Visit Days : 1  Acknowledge Orders  Time In/Out  PT Charge Capture  Rehab Caseload Tracker  Episode   Zeb Soria is a 67 y.o. male   PRIMARY DIAGNOSIS: Status post left knee replacement  Primary osteoarthritis of left knee [M17.12]  Procedure(s) (LRB):  lt KNEE TOTAL ARTHROPLASTY ROBOTIC/ SDD (Left)  Day of Surgery  Reason for Referral: Pain in Left Knee (M25.562)  Stiffness of Left Knee, Not elsewhere classified (M25.662)  Difficulty in walking, Not elsewhere classified (R26.2)  Outpatient in a bed: Payor: [employer] / Plan: Leidy Rosen PPO / Product Type: Medicare /     REHAB RECOMMENDATIONS:   Recommendation to date pending progress:  Setting:  Home Health Therapy    Equipment:    3 in 1 Bedside Commode   Rolling Walker     RANGE OF MOTION:   Left Knee Flexion: L Knee Flexion (0-145): 110  Left Knee Extension: L Knee Extension (0): 0     GAIT: I Mod I S SBA CGA Min Mod Max Total  NT x2 Comments:   Level of Assistance [] [] [] [x] [] [] [] [] [] [] []            Weightbearing Status  Left Lower Extremity Weight Bearing: Weight Bearing As Tolerated    Distance  additional
Discharge instructions given and prescriptions reviewed with patient. Opportunity for questions and clarification provided. Patient verbalizes understanding. Patient discharged in stable condition to car via wheelchair.
OCCUPATIONAL THERAPY Initial Assessment and Daily Note      (Link to Caseload Tracking: OT Visit Days: 1  OT Orders   Time  OT Charge Capture  Rehab Caseload Tracker  Episode     Klaus Wilson is a 67 y.o. male   PRIMARY DIAGNOSIS: Status post left knee replacement  Primary osteoarthritis of left knee [M17.12]  Procedure(s) (LRB):  lt KNEE TOTAL ARTHROPLASTY ROBOTIC/ SDD (Left)  Day of Surgery  Reason for Referral: Pain in Left Knee (M25.562)  Stiffness of Left Knee, Not elsewhere classified (B04.107)  Outpatient in a bed: Payor: Dano Carlson / Plan: [employer] PPO / Product Type: Medicare /     ASSESSMENT:     REHAB RECOMMENDATIONS:   Recommendation to date pending progress:  Setting:  No further skilled therapy after discharge from hospital    Equipment:    None     ASSESSMENT:  Mr. Tyson Mabry is s/p left TKA and presents with decreased independence with functional mobility and activities of daily living as compared to baseline level of function and safety. Patient would benefit from skilled Occupational Therapy to maximize independence and safety with self-care task and functional mobility. Patient able to complete  lower body dressing, upper body dressing, and toileting at bathroom/recliner with contact guard assist. Patient educated on safety with functional transfers and lower body dressing compensatory strategies. Mobilized from recliner/chair to bathroom to recliner using a rolling walker with assist. Patient is hopeful to return home day of surgery.        [employer] AM-PAC 6 Clicks Daily Activity Inpatient Short Form:    AM-PAC Daily Activity - Inpatient   How much help is needed for putting on and taking off regular lower body clothing?: A Little  How much help is needed for bathing (which includes washing, rinsing, drying)?: A Little  How much help is needed for toileting (which includes using toilet, bedpan, or urinal)?: A Little  How much help is needed for putting on
TRANSFER - IN REPORT:    Verbal report received from [address] [facility], RN on [facility]  being received from PACU for routine progression of patient care      Report consisted of patient's Situation, Background, Assessment and   Recommendations(SBAR). Information from the following report(s) Nurse Handoff Report was reviewed with the receiving nurse. Opportunity for questions and clarification was provided. Assessment completed upon patient's arrival to unit and care assumed.
respiratory arrest/Other

## 2024-12-27 NOTE — PROGRESS NOTES
Name: Tyrese Daigle  YOB: 1950  Gender: male  MRN: [id number]  Date of Encounter:  12/31/2024       CHIEF COMPLAINT:     Chief Complaint   Patient presents with    Injections     R GHI        SUBJECTIVE/OBJECTIVE:      HPI:    Patient is a 74 y.o. pleasant male who presents today for an injection of the left shoulder.    Recall Hx:  Mr. Daigle has had chronic atraumatic left shoulder pain. His first examination was consistent with rotator cuff tendinosis. he had preserved cuff strength, and XR findings of GH arthritis. He had great relief of shoulder pain with first CSI to the left GHJ. It gave him nearly 3 months of pain relief. We repeated injection just prior to his MARILEE with Dr. Alvarez.      Repeat injections have given him about 2.5 months of pain relief.  He denies any new injury.  We have previously discussed further options for management of his shoulder pain including MRI / possible surgical evaluation, but he wishes to continue injections at this time.      2/23/24: GHI performed.     5/22/24: Requesting repeat injection. Pleased with 2.5 months of pain relief.     9/3/24: His shoulder is doing fairly well. He has full range. He denies daily pain. He does take Aleve and Tylenol on a daily basis for general aches and pains.  We delayed injection due to how well he was doing.     12/21/24: He is having increased aching in the shoulder. He wishes to repeat injection today.     ASSESSMENT/PLAN:     Encounter Diagnoses   Name Primary?    Glenohumeral arthritis, left Yes    Tendinopathy of left rotator cuff         We discussed left glenohumeral joint injection today, risks and benefits of injection including pain with injection, bleeding, damage to surrounding structures, infection, elevation in blood glucose, steroid flare. They wished to proceed with injection today and this was performed.      Orders Placed This Encounter    US ARTHR/ASP/INJ MAJOR JNT/BURSA LEFT     Standing Status:

## 2024-12-31 ENCOUNTER — OFFICE VISIT (OUTPATIENT)
Dept: ORTHOPEDIC SURGERY | Age: 74
End: 2024-12-31
Payer: MEDICARE

## 2024-12-31 DIAGNOSIS — M19.012 GLENOHUMERAL ARTHRITIS, LEFT: Primary | ICD-10-CM

## 2024-12-31 DIAGNOSIS — M67.912 TENDINOPATHY OF LEFT ROTATOR CUFF: ICD-10-CM

## 2024-12-31 PROCEDURE — 20611 DRAIN/INJ JOINT/BURSA W/US: CPT | Performed by: STUDENT IN AN ORGANIZED HEALTH CARE EDUCATION/TRAINING PROGRAM

## 2024-12-31 RX ORDER — METHYLPREDNISOLONE ACETATE 80 MG/ML
80 INJECTION, SUSPENSION INTRA-ARTICULAR; INTRALESIONAL; INTRAMUSCULAR; SOFT TISSUE ONCE
Status: COMPLETED | OUTPATIENT
Start: 2024-12-31 | End: 2024-12-31

## 2024-12-31 RX ADMIN — METHYLPREDNISOLONE ACETATE 80 MG: 80 INJECTION, SUSPENSION INTRA-ARTICULAR; INTRALESIONAL; INTRAMUSCULAR; SOFT TISSUE at 09:13

## (undated) DEVICE — BIPOLAR SEALER 23-112-1 AQM 6.0: Brand: AQUAMANTYS ®

## (undated) DEVICE — SOLUTION IRRIG 3000ML 0.9% SOD CHL USP UROMATIC PLAS CONT

## (undated) DEVICE — SUTURE ABS ANTIBACT 1-0 CTX 24IN STRATAFIX PDS+ SXPP1A445

## (undated) DEVICE — SOLUTION IRRIG 1000ML STRL H2O USP PLAS POUR BTL

## (undated) DEVICE — PIN BNE FIX TEMP L140MM DIA4MM MAKO

## (undated) DEVICE — PIN BNE FIX TEMP L110MM DIA4MM MAKO

## (undated) DEVICE — TOTAL KNEE DR JENNINGS: Brand: MEDLINE INDUSTRIES, INC.

## (undated) DEVICE — BLADE SURG SAW STD S STL OSC W/ SERR EDGE DISP

## (undated) DEVICE — 450 ML BOTTLE OF 0.05% CHLORHEXIDINE GLUCONATE IN 99.95% STERILE WATER FOR IRRIGATION, USP AND APPLICATOR.: Brand: IRRISEPT ANTIMICROBIAL WOUND LAVAGE

## (undated) DEVICE — SUTURE VCRL SZ 1 L36IN ABSRB UD L36MM CT-1 1/2 CIR J947H

## (undated) DEVICE — SYRINGE MED 50ML LUERLOCK TIP

## (undated) DEVICE — GLOVE SURG SZ 85 L12IN FNGR THK79MIL GRN LTX FREE

## (undated) DEVICE — BLADE RMR L46MM PAT PILOT H

## (undated) DEVICE — DRAPE,TOP,102X53,STERILE: Brand: MEDLINE

## (undated) DEVICE — STERILE PVP: Brand: MEDLINE INDUSTRIES, INC.

## (undated) DEVICE — GLOVE SURG SZ 65 THK91MIL LTX FREE SYN POLYISOPRENE

## (undated) DEVICE — SUTURE ETHBND EXCEL SZ 2 L30IN NONABSORBABLE GRN L75MM LR X496T

## (undated) DEVICE — STERILE PRESSURE PROTECTOR PAD® FOR DE MAYO UNIVERSAL DISTRACTOR® (10/CASE): Brand: DE MAYO UNIVERSAL DISTRACTOR®

## (undated) DEVICE — GLOVE ORANGE PI 8 1/2   MSG9085

## (undated) DEVICE — KIT DRP FOR RIO ROBOTIC ARM ASST SYS

## (undated) DEVICE — KIT TRK KNEE PROC VIZADISC

## (undated) DEVICE — KIT INT FIX FEM TIB CKPT MAKOPLASTY

## (undated) DEVICE — SUTURE MCRYL SZ 2-0 L27IN ABSRB UD SH L26MM TAPERPOINT NDL Y417H

## (undated) DEVICE — YANKAUER,FLEXIBLE HANDLE,REGLR CAPACITY: Brand: MEDLINE INDUSTRIES, INC.

## (undated) DEVICE — GLOVE SURG SZ 7 L11.33IN FNGR THK9.8MIL STRW LTX POLYMER

## (undated) DEVICE — SOLUTION IV 250ML 0.9% SOD CHL PH 5 INJ USP VIAFLX PLAS

## (undated) DEVICE — SOLUTION IRRIG 1000ML 0.9% SOD CHL USP POUR PLAS BTL